# Patient Record
Sex: FEMALE | Race: WHITE | Employment: UNEMPLOYED | URBAN - METROPOLITAN AREA
[De-identification: names, ages, dates, MRNs, and addresses within clinical notes are randomized per-mention and may not be internally consistent; named-entity substitution may affect disease eponyms.]

---

## 2018-03-07 ENCOUNTER — HOSPITAL ENCOUNTER (OUTPATIENT)
Dept: MAMMOGRAPHY | Age: 53
Discharge: HOME | End: 2018-03-07
Attending: FAMILY MEDICINE
Payer: COMMERCIAL

## 2018-03-07 DIAGNOSIS — Z12.31 VISIT FOR SCREENING MAMMOGRAM: ICD-10-CM

## 2018-03-07 PROCEDURE — 77067 SCR MAMMO BI INCL CAD: CPT

## 2020-07-04 ENCOUNTER — HOSPITAL ENCOUNTER (INPATIENT)
Age: 55
LOS: 6 days | Discharge: HOME HEALTH | DRG: 137 | End: 2020-07-10
Attending: INTERNAL MEDICINE | Admitting: FAMILY MEDICINE
Payer: COMMERCIAL

## 2020-07-04 ENCOUNTER — APPOINTMENT (OUTPATIENT)
Dept: GENERAL RADIOLOGY | Age: 55
DRG: 137 | End: 2020-07-04
Attending: EMERGENCY MEDICINE
Payer: COMMERCIAL

## 2020-07-04 ENCOUNTER — HOSPITAL ENCOUNTER (EMERGENCY)
Age: 55
Discharge: OTHER INSTITUTION | DRG: 137 | End: 2020-07-04
Attending: EMERGENCY MEDICINE
Payer: COMMERCIAL

## 2020-07-04 VITALS
SYSTOLIC BLOOD PRESSURE: 122 MMHG | HEART RATE: 54 BPM | BODY MASS INDEX: 36.65 KG/M2 | DIASTOLIC BLOOD PRESSURE: 59 MMHG | OXYGEN SATURATION: 96 % | WEIGHT: 220 LBS | HEIGHT: 65 IN | TEMPERATURE: 98.4 F | RESPIRATION RATE: 16 BRPM

## 2020-07-04 DIAGNOSIS — Z20.822 SUSPECTED COVID-19 VIRUS INFECTION: Primary | ICD-10-CM

## 2020-07-04 PROBLEM — E11.9 DIABETES MELLITUS TYPE 2, CONTROLLED (HCC): Status: ACTIVE | Noted: 2020-07-04

## 2020-07-04 PROBLEM — F84.0 AUTISM DISORDER: Status: ACTIVE | Noted: 2020-07-04

## 2020-07-04 PROBLEM — J96.01 ACUTE RESPIRATORY FAILURE WITH HYPOXEMIA (HCC): Status: ACTIVE | Noted: 2020-07-04

## 2020-07-04 PROBLEM — U07.1 COVID-19: Status: ACTIVE | Noted: 2020-07-04

## 2020-07-04 LAB
ALBUMIN SERPL-MCNC: 3 G/DL (ref 3.5–5)
ALBUMIN/GLOB SERPL: 0.7 {RATIO} (ref 1.2–3.5)
ALP SERPL-CCNC: 51 U/L (ref 50–130)
ALT SERPL-CCNC: 38 U/L (ref 12–65)
ANION GAP SERPL CALC-SCNC: 7 MMOL/L (ref 7–16)
AST SERPL-CCNC: 51 U/L (ref 15–37)
BASOPHILS # BLD: 0 K/UL (ref 0–0.2)
BASOPHILS NFR BLD: 0 % (ref 0–2)
BILIRUB SERPL-MCNC: 0.5 MG/DL (ref 0.2–1.1)
BUN SERPL-MCNC: 14 MG/DL (ref 6–23)
CALCIUM SERPL-MCNC: 8.5 MG/DL (ref 8.3–10.4)
CHLORIDE SERPL-SCNC: 106 MMOL/L (ref 98–107)
CO2 SERPL-SCNC: 24 MMOL/L (ref 21–32)
COVID-19 RAPID TEST, COVR: DETECTED
CREAT SERPL-MCNC: 0.95 MG/DL (ref 0.6–1)
DIFFERENTIAL METHOD BLD: ABNORMAL
EOSINOPHIL # BLD: 0 K/UL (ref 0–0.8)
EOSINOPHIL NFR BLD: 0 % (ref 0.5–7.8)
ERYTHROCYTE [DISTWIDTH] IN BLOOD BY AUTOMATED COUNT: 13.3 % (ref 11.9–14.6)
GLOBULIN SER CALC-MCNC: 4.4 G/DL (ref 2.3–3.5)
GLUCOSE SERPL-MCNC: 123 MG/DL (ref 65–100)
HCT VFR BLD AUTO: 38.8 % (ref 35.8–46.3)
HGB BLD-MCNC: 13 G/DL (ref 11.7–15.4)
IMM GRANULOCYTES # BLD AUTO: 0 K/UL (ref 0–0.5)
IMM GRANULOCYTES NFR BLD AUTO: 1 % (ref 0–5)
LACTATE SERPL-SCNC: 1.5 MMOL/L (ref 0.4–2)
LYMPHOCYTES # BLD: 0.8 K/UL (ref 0.5–4.6)
LYMPHOCYTES NFR BLD: 12 % (ref 13–44)
MCH RBC QN AUTO: 28.4 PG (ref 26.1–32.9)
MCHC RBC AUTO-ENTMCNC: 33.5 G/DL (ref 31.4–35)
MCV RBC AUTO: 84.7 FL (ref 79.6–97.8)
MONOCYTES # BLD: 0.3 K/UL (ref 0.1–1.3)
MONOCYTES NFR BLD: 5 % (ref 4–12)
NEUTS SEG # BLD: 5.2 K/UL (ref 1.7–8.2)
NEUTS SEG NFR BLD: 82 % (ref 43–78)
NRBC # BLD: 0 K/UL (ref 0–0.2)
PLATELET # BLD AUTO: 210 K/UL (ref 150–450)
PMV BLD AUTO: 10 FL (ref 9.4–12.3)
POTASSIUM SERPL-SCNC: 4.5 MMOL/L (ref 3.5–5.1)
PROT SERPL-MCNC: 7.4 G/DL (ref 6.3–8.2)
RBC # BLD AUTO: 4.58 M/UL (ref 4.05–5.2)
SODIUM SERPL-SCNC: 137 MMOL/L (ref 136–145)
SOURCE, COVRS: ABNORMAL
WBC # BLD AUTO: 6.3 K/UL (ref 4.3–11.1)

## 2020-07-04 PROCEDURE — 85025 COMPLETE CBC W/AUTO DIFF WBC: CPT

## 2020-07-04 PROCEDURE — 99285 EMERGENCY DEPT VISIT HI MDM: CPT

## 2020-07-04 PROCEDURE — 74011250636 HC RX REV CODE- 250/636: Performed by: EMERGENCY MEDICINE

## 2020-07-04 PROCEDURE — 87635 SARS-COV-2 COVID-19 AMP PRB: CPT

## 2020-07-04 PROCEDURE — 80053 COMPREHEN METABOLIC PANEL: CPT

## 2020-07-04 PROCEDURE — 71045 X-RAY EXAM CHEST 1 VIEW: CPT

## 2020-07-04 PROCEDURE — 83605 ASSAY OF LACTIC ACID: CPT

## 2020-07-04 PROCEDURE — 65270000029 HC RM PRIVATE

## 2020-07-04 PROCEDURE — 74011250636 HC RX REV CODE- 250/636: Performed by: FAMILY MEDICINE

## 2020-07-04 PROCEDURE — 74011250637 HC RX REV CODE- 250/637: Performed by: EMERGENCY MEDICINE

## 2020-07-04 PROCEDURE — 74011000258 HC RX REV CODE- 258: Performed by: FAMILY MEDICINE

## 2020-07-04 RX ORDER — ACETAMINOPHEN 325 MG/1
650 TABLET ORAL
Status: DISCONTINUED | OUTPATIENT
Start: 2020-07-04 | End: 2020-07-10 | Stop reason: HOSPADM

## 2020-07-04 RX ORDER — ADHESIVE BANDAGE
30 BANDAGE TOPICAL DAILY PRN
Status: DISCONTINUED | OUTPATIENT
Start: 2020-07-04 | End: 2020-07-10 | Stop reason: HOSPADM

## 2020-07-04 RX ORDER — SODIUM CHLORIDE 0.9 % (FLUSH) 0.9 %
5-40 SYRINGE (ML) INJECTION AS NEEDED
Status: DISCONTINUED | OUTPATIENT
Start: 2020-07-04 | End: 2020-07-10 | Stop reason: HOSPADM

## 2020-07-04 RX ORDER — SODIUM CHLORIDE 9 MG/ML
125 INJECTION, SOLUTION INTRAVENOUS CONTINUOUS
Status: DISCONTINUED | OUTPATIENT
Start: 2020-07-04 | End: 2020-07-05

## 2020-07-04 RX ORDER — ONDANSETRON 2 MG/ML
4 INJECTION INTRAMUSCULAR; INTRAVENOUS
Status: DISCONTINUED | OUTPATIENT
Start: 2020-07-04 | End: 2020-07-10 | Stop reason: HOSPADM

## 2020-07-04 RX ORDER — ACETAMINOPHEN 500 MG
1000 TABLET ORAL
Status: COMPLETED | OUTPATIENT
Start: 2020-07-04 | End: 2020-07-04

## 2020-07-04 RX ORDER — SODIUM CHLORIDE 0.9 % (FLUSH) 0.9 %
5-40 SYRINGE (ML) INJECTION EVERY 8 HOURS
Status: DISCONTINUED | OUTPATIENT
Start: 2020-07-04 | End: 2020-07-10 | Stop reason: HOSPADM

## 2020-07-04 RX ORDER — ENOXAPARIN SODIUM 100 MG/ML
40 INJECTION SUBCUTANEOUS EVERY 24 HOURS
Status: DISCONTINUED | OUTPATIENT
Start: 2020-07-04 | End: 2020-07-10 | Stop reason: HOSPADM

## 2020-07-04 RX ADMIN — Medication 10 ML: at 21:32

## 2020-07-04 RX ADMIN — SODIUM CHLORIDE 125 ML/HR: 9 INJECTION, SOLUTION INTRAVENOUS at 20:40

## 2020-07-04 RX ADMIN — CEFTRIAXONE SODIUM 2 G: 2 INJECTION, POWDER, FOR SOLUTION INTRAMUSCULAR; INTRAVENOUS at 20:39

## 2020-07-04 RX ADMIN — ACETAMINOPHEN 1000 MG: 500 TABLET, FILM COATED ORAL at 12:41

## 2020-07-04 RX ADMIN — SODIUM CHLORIDE 1000 ML: 900 INJECTION, SOLUTION INTRAVENOUS at 14:52

## 2020-07-04 RX ADMIN — AZITHROMYCIN DIHYDRATE 500 MG: 500 INJECTION, POWDER, LYOPHILIZED, FOR SOLUTION INTRAVENOUS at 20:39

## 2020-07-04 RX ADMIN — ENOXAPARIN SODIUM 40 MG: 40 INJECTION SUBCUTANEOUS at 20:40

## 2020-07-04 NOTE — ED PROVIDER NOTES
49-year-old lady presents with concerns about fevers, coughing, and not feeling well. She is autistic and she lives with her parents. Both of whom have tested positive for COVID-19 as has her brother. They all got together with him on Father's Day and later that night he tested positive for COVID-19. She is gotten worse and has become more short of breath over the last few days. She does have a history of diabetes. Due to her developmental delays she does not communicate well and most of the history is obtained from the sister. Elements of this note were created using speech recognition software. As such, errors of speech recognition may be present.            Past Medical History:   Diagnosis Date    Diabetes ([facility] [address]     Ill-defined condition        Past Surgical History:   Procedure Laterality Date    HX ORTHOPAEDIC           Family History:   Problem Relation Age of Onset    Breast Cancer Neg Hx        Social History     Socioeconomic History    Marital status: SINGLE     Spouse name: Not on file    Number of children: Not on file    Years of education: Not on file    Highest education level: Not on file   Occupational History    Not on file   Social Needs    Financial resource strain: Not on file    Food insecurity     Worry: Not on file     Inability: Not on file    Transportation needs     Medical: Not on file     Non-medical: Not on file   Tobacco Use    Smoking status: Not on file   Substance and Sexual Activity    Alcohol use: Not on file    Drug use: Not on file    Sexual activity: Not on file   Lifestyle    Physical activity     Days per week: Not on file     Minutes per session: Not on file    Stress: Not on file   Relationships    Social connections     Talks on phone: Not on file     Gets together: Not on file     Attends [employer] service: Not on file     Active member of club or organization: Not on file     Attends meetings of clubs or organizations: Not on file Relationship status: Not on file    Intimate partner violence     Fear of current or ex partner: Not on file     Emotionally abused: Not on file     Physically abused: Not on file     Forced sexual activity: Not on file   Other Topics Concern    Not on file   Social History Narrative    Not on file         ALLERGIES: Patient has no known allergies. Review of Systems   Reason unable to perform ROS: Review of systems obtained from the sister. Constitutional: Positive for appetite change, chills, fatigue and fever. Negative for diaphoresis. HENT: Negative for congestion, rhinorrhea and sore throat. Eyes: Negative for redness and visual disturbance. Respiratory: Positive for cough and shortness of breath. Negative for chest tightness and wheezing. Cardiovascular: Negative for chest pain and palpitations. Gastrointestinal: Negative for abdominal pain, blood in stool, diarrhea, nausea and vomiting. Endocrine: Negative for polydipsia and polyuria. Genitourinary: Negative for dysuria and hematuria. Musculoskeletal: Negative for arthralgias, myalgias and neck stiffness. Skin: Negative for rash. Allergic/Immunologic: Negative for environmental allergies and food allergies. Neurological: Negative for dizziness, weakness and headaches. Hematological: Negative for adenopathy. Does not bruise/bleed easily. Psychiatric/Behavioral: Negative for confusion and sleep disturbance. The patient is not nervous/anxious. Vitals:    07/04/20 1235 07/04/20 1312   BP: 97/65    Pulse: 87    Resp: 16    Temp: (!) 102.4 °F (39.1 °C)    SpO2: 91% 92%   Weight: 99.8 kg (220 lb)    Height: 5' 5\" (1.651 m)             Physical Exam  Vitals signs and nursing note reviewed. Constitutional:       General: She is not in acute distress. Appearance: She is well-developed. She is not toxic-appearing. HENT:      Head: Normocephalic and atraumatic. Eyes:      General: No scleral icterus.         Right eye: No discharge. Left eye: No discharge. Conjunctiva/sclera: Conjunctivae normal.      Pupils: Pupils are equal, round, and reactive to light. Neck:      Musculoskeletal: Normal range of motion. No neck rigidity. Cardiovascular:      Rate and Rhythm: Normal rate and regular rhythm. Heart sounds: Normal heart sounds. Pulmonary:      Effort: Pulmonary effort is normal. No respiratory distress. Breath sounds: Normal breath sounds. No wheezing or rales. Chest:      Chest wall: No tenderness. Abdominal:      General: Bowel sounds are normal. There is no distension. Palpations: Abdomen is soft. Tenderness: There is no guarding or rebound. Musculoskeletal: Normal range of motion. General: No tenderness. Lymphadenopathy:      Cervical: No cervical adenopathy. Skin:     General: Skin is warm and dry. Neurological:      General: No focal deficit present. Mental Status: She is alert and oriented to person, place, and time. Psychiatric:         Mood and Affect: Mood normal.         Behavior: Behavior normal.          MDM  Number of Diagnoses or Management Options  Diagnosis management comments: Patient's exam and history are consistent with COVID-19. She is hypoxic on room air. I will plan to admit for oxygen and supportive care. ED Course as of Jul 04 1528   Sat Jul 04, 2020   1356 Very high suspicion for COVID-19. She has been hypoxic on room air and her chest x-ray is consistent with it.   I spoke with the [facility] hospitalist who kindly accepted the patient in transfer.    Candie Simmons      ED Course User Index  [AC] Cristiano Mckoy MD       Procedures

## 2020-07-04 NOTE — PROGRESS NOTES
Patient arrived to room 825 via EMS transport. Patient is alert and orientated with no complaints at this time. Patient is autistic. Patient is calm and cooperative at this time. Patient IV in L AC is c/d/i. Duel skin assessment completed with Radu Hong RN. Patient is flush in the face but no skin issues at this time. Patient is orientated to room and surroundings. Patient able to ambulate independently with no assistance. Bed in low locked position with call light within reach. Patient instructed to call if assistance is needed. TV on for patient's enjoyment.   Report to be given to oncoming RN 7p-7a

## 2020-07-04 NOTE — ED TRIAGE NOTES
Patient with history of autism and been exposed to COVID 19 complaining of weakness, diarrhea and decreased oxygen levels at home. Patient with fever in triage. Patient with mild cough per sister as well. Patient with mask on at this time.

## 2020-07-04 NOTE — PROGRESS NOTES
TRANSFER - IN REPORT:    Verbal report received from Simeon Sharp RN on LeukoDx  being received from [place] ED for routine progression of care      Report consisted of patients Situation, Background, Assessment and   Recommendations(SBAR). Information from the following report(s) SBAR was reviewed with the receiving nurse. Opportunity for questions and clarification was provided. Assessment completed upon patients arrival to unit and care assumed.

## 2020-07-04 NOTE — ED NOTES
TRANSFER - OUT REPORT:    Verbal report given to PATRICK Cordero on [employer]  being transferred to [address] [place] [address] for routine progression of care       Report consisted of patients Situation, Background, Assessment and   Recommendations(SBAR). Information from the following report(s) ED Summary, MAR and Recent Results was reviewed with the receiving nurse. Lines:   Peripheral IV 07/04/20 Right Antecubital (Active)        Opportunity for questions and clarification was provided.       Patient transported with:   [employer]
CDU NOTE WILDA ROB: Pt resting comfortably. Vomiting resolved, tolerated PO intake, mild nausea still, doesn't want anything for the nausea, still with watery NB diarrhea 2 x today. NAD, VSS. Abd: flat/ND/soft, mild ttp to epigastric area. pt states he thinks that is from his hiatal hernia, no abd pain without palpation.  will continue monitoring.

## 2020-07-04 NOTE — H&P
HOSPITALIST INITIAL HISTORY AND PHYSICAL    NAME:  Logan Payne   Age:  54 y.o.  :   1965   MRN:   [id number]  PCP: Pola Guzman MD  Consulting MD:  Treatment Team: Attending Provider: Tanja Mcconnell MD    CHIEF COMPLAINT: fever, cough    HISTORY OF PRESENT ILLNESS:   Logan Payne is a 54 y.o. female with a past medical history of autism and diabetes who presents to the ER with copmlaint of fevers and cough for the past few days. Both of her parents and brother have tested positive for COVID-19. She has no complaints currently and denies any pain or discomfort. Reports that her breathing is better. REVIEW OF SYSTEMS: Comprehensive ROS performed. Admits to fevers, chills, denies nausea, vomiting, otherwise negative. Past Medical History:   Diagnosis Date    Diabetes ([address]     Ill-defined condition         Past Surgical History:   Procedure Laterality Date    HX ORTHOPAEDIC         None       No Known Allergies    FAMILY HISTORY: Reviewed. Negative except   Family History   Problem Relation Age of Onset    Breast Cancer Neg Hx        Social History     Tobacco Use    Smoking status: Not on file   Substance Use Topics    Alcohol use: Not on file         Objective:     Visit Vitals  /58 (BP 1 Location: Right arm, BP Patient Position: At rest)   Pulse 62   Temp 98.3 °F (36.8 °C)   Resp 16   SpO2 95%      Temp (24hrs), Av.3 °F (37.4 °C), Min:98.1 °F (36.7 °C), Max:102.4 °F (39.1 °C)    Oxygen Therapy  O2 Sat (%): 95 % (20 1831)  Physical Exam:  General:    The patient is a pleasant middle aged female in no acute distress. Head:   Normocephalic/atraumatic. Eyes:  No palpebral pallor or scleral icterus. ENT:  External auricular and nasal exam within normal limits. Mucous membranes are moist.  Lungs:   No respiratory distress or accessory muscle use. Abdomen:    non-distended  Skin:     Normal color, texture, and turgor.  No rashes, lesions, or jaundice. Neurologic: CN II-XII grossly intact and symmetrical.     Moving all four extremities well with no focal deficits. Psychiatric: Pleasant demeanor, appropriate affect. Alert     Data Review:   Recent Results (from the past 24 hour(s))   CBC WITH AUTOMATED DIFF    Collection Time: 07/04/20  1:32 PM   Result Value Ref Range    WBC 6.3 4.3 - 11.1 K/uL    RBC 4.58 4.05 - 5.2 M/uL    HGB 13.0 11.7 - 15.4 g/dL    HCT 38.8 35.8 - 46.3 %    MCV 84.7 79.6 - 97.8 FL    MCH 28.4 26.1 - 32.9 PG    MCHC 33.5 31.4 - 35.0 g/dL    RDW 13.3 11.9 - 14.6 %    PLATELET 921 254 - 962 K/uL    MPV 10.0 9.4 - 12.3 FL    ABSOLUTE NRBC 0.00 0.0 - 0.2 K/uL    DF AUTOMATED      NEUTROPHILS 82 (H) 43 - 78 %    LYMPHOCYTES 12 (L) 13 - 44 %    MONOCYTES 5 4.0 - 12.0 %    EOSINOPHILS 0 (L) 0.5 - 7.8 %    BASOPHILS 0 0.0 - 2.0 %    IMMATURE GRANULOCYTES 1 0.0 - 5.0 %    ABS. NEUTROPHILS 5.2 1.7 - 8.2 K/UL    ABS. LYMPHOCYTES 0.8 0.5 - 4.6 K/UL    ABS. MONOCYTES 0.3 0.1 - 1.3 K/UL    ABS. EOSINOPHILS 0.0 0.0 - 0.8 K/UL    ABS. BASOPHILS 0.0 0.0 - 0.2 K/UL    ABS. IMM. GRANS. 0.0 0.0 - 0.5 K/UL   METABOLIC PANEL, COMPREHENSIVE    Collection Time: 07/04/20  1:32 PM   Result Value Ref Range    Sodium 137 136 - 145 mmol/L    Potassium 4.5 3.5 - 5.1 mmol/L    Chloride 106 98 - 107 mmol/L    CO2 24 21 - 32 mmol/L    Anion gap 7 7 - 16 mmol/L    Glucose 123 (H) 65 - 100 mg/dL    BUN 14 6 - 23 MG/DL    Creatinine 0.95 0.6 - 1.0 MG/DL    GFR est AA >60 >60 ml/min/1.73m2    GFR est non-AA >60 >60 ml/min/1.73m2    Calcium 8.5 8.3 - 10.4 MG/DL    Bilirubin, total 0.5 0.2 - 1.1 MG/DL    ALT (SGPT) 38 12 - 65 U/L    AST (SGOT) 51 (H) 15 - 37 U/L    Alk.  phosphatase 51 50 - 130 U/L    Protein, total 7.4 6.3 - 8.2 g/dL    Albumin 3.0 (L) 3.5 - 5.0 g/dL    Globulin 4.4 (H) 2.3 - 3.5 g/dL    A-G Ratio 0.7 (L) 1.2 - 3.5     LACTIC ACID    Collection Time: 07/04/20  1:32 PM   Result Value Ref Range    Lactic acid 1.5 0.4 - 2.0 MMOL/L   COVID-19 RAPID TEST Collection Time: 07/04/20  2:22 PM   Result Value Ref Range    Specimen source Nasopharyngeal      COVID-19 rapid test Detected (A) NOTD         Imaging Carl Quigley:  Xr Chest Port    Result Date: 7/4/2020  Impression:  Findings consistent with patient's suspected acute atypical pneumonia. Assessment and Plan:     Principal Problem:    Acute respiratory failure with hypoxemia ([address] (7/4/2020)    Wean O2 as tolerated. Active Problems:    COVID-19 (7/4/2020)    Positive on rapid test. Also evidence of pneumonia on CXR. HCQ, azithromycin, Rocephin, zinc, vitamin C. Follow EKG, mag phos. Check D dimer, CRP, ferritin, HIV, LDH      Diabetes mellitus type 2, controlled ([address] (7/4/2020)    Stable. Continue home meds. SSI      Autism disorder (7/4/2020)    Stable. DVT Prophylaxis: Lovenox      Code Status: FULL CODE      Disposition: Admit to med/surg for evaluation and treatment as per above.       Anticipated discharge: 2-3 days     Signed By: Nick Muir MD     July 4, 2020

## 2020-07-04 NOTE — ACP (ADVANCE CARE PLANNING)
MARV spoke with the patient's mother Storm Marshall who states that the patient is autistic and she and her  have POA for the patient. Nikolas Tiwari states that the patient does not have an ACP, but she and her  make medical decisions for the patient.

## 2020-07-05 LAB
ABO + RH BLD: NORMAL
ALBUMIN SERPL-MCNC: 2.7 G/DL (ref 3.5–5)
ALBUMIN/GLOB SERPL: 0.8 {RATIO} (ref 1.2–3.5)
ALP SERPL-CCNC: 52 U/L (ref 50–136)
ALT SERPL-CCNC: 31 U/L (ref 12–65)
ANION GAP SERPL CALC-SCNC: 7 MMOL/L (ref 7–16)
ANION GAP SERPL CALC-SCNC: 8 MMOL/L (ref 7–16)
AST SERPL-CCNC: 41 U/L (ref 15–37)
BASOPHILS # BLD: 0 K/UL (ref 0–0.2)
BASOPHILS NFR BLD: 0 % (ref 0–2)
BILIRUB SERPL-MCNC: 0.3 MG/DL (ref 0.2–1.1)
BUN SERPL-MCNC: 11 MG/DL (ref 6–23)
BUN SERPL-MCNC: 7 MG/DL (ref 6–23)
CALCIUM SERPL-MCNC: 7.5 MG/DL (ref 8.3–10.4)
CALCIUM SERPL-MCNC: 8 MG/DL (ref 8.3–10.4)
CHLORIDE SERPL-SCNC: 112 MMOL/L (ref 98–107)
CHLORIDE SERPL-SCNC: 113 MMOL/L (ref 98–107)
CO2 SERPL-SCNC: 24 MMOL/L (ref 21–32)
CO2 SERPL-SCNC: 26 MMOL/L (ref 21–32)
CREAT SERPL-MCNC: 0.75 MG/DL (ref 0.6–1)
CREAT SERPL-MCNC: 0.78 MG/DL (ref 0.6–1)
CRP SERPL HS-MCNC: 119 MG/L
D DIMER PPP FEU-MCNC: 1.1 UG/ML(FEU)
DIFFERENTIAL METHOD BLD: ABNORMAL
EOSINOPHIL # BLD: 0 K/UL (ref 0–0.8)
EOSINOPHIL NFR BLD: 1 % (ref 0.5–7.8)
ERYTHROCYTE [DISTWIDTH] IN BLOOD BY AUTOMATED COUNT: 13.6 % (ref 11.9–14.6)
FERRITIN SERPL-MCNC: 688 NG/ML (ref 8–388)
GLOBULIN SER CALC-MCNC: 3.2 G/DL (ref 2.3–3.5)
GLUCOSE BLD STRIP.AUTO-MCNC: 182 MG/DL (ref 65–100)
GLUCOSE SERPL-MCNC: 84 MG/DL (ref 65–100)
GLUCOSE SERPL-MCNC: 88 MG/DL (ref 65–100)
HCT VFR BLD AUTO: 39.7 % (ref 35.8–46.3)
HGB BLD-MCNC: 12.4 G/DL (ref 11.7–15.4)
HIV 1+2 AB+HIV1 P24 AG SERPL QL IA: NONREACTIVE
HIV12 RESULT COMMENT, HHIVC: ABNORMAL
IMM GRANULOCYTES # BLD AUTO: 0 K/UL (ref 0–0.5)
IMM GRANULOCYTES NFR BLD AUTO: 1 % (ref 0–5)
LDH SERPL L TO P-CCNC: 296 U/L (ref 100–190)
LYMPHOCYTES # BLD: 1.3 K/UL (ref 0.5–4.6)
LYMPHOCYTES NFR BLD: 21 % (ref 13–44)
MAGNESIUM SERPL-MCNC: 2.1 MG/DL (ref 1.8–2.4)
MCH RBC QN AUTO: 27.7 PG (ref 26.1–32.9)
MCHC RBC AUTO-ENTMCNC: 31.2 G/DL (ref 31.4–35)
MCV RBC AUTO: 88.6 FL (ref 79.6–97.8)
MONOCYTES # BLD: 0.3 K/UL (ref 0.1–1.3)
MONOCYTES NFR BLD: 4 % (ref 4–12)
NEUTS SEG # BLD: 4.6 K/UL (ref 1.7–8.2)
NEUTS SEG NFR BLD: 74 % (ref 43–78)
NRBC # BLD: 0 K/UL (ref 0–0.2)
PHOSPHATE SERPL-MCNC: 3 MG/DL (ref 2.5–4.5)
PLATELET # BLD AUTO: 226 K/UL (ref 150–450)
PMV BLD AUTO: 10.8 FL (ref 9.4–12.3)
POTASSIUM SERPL-SCNC: 3.7 MMOL/L (ref 3.5–5.1)
POTASSIUM SERPL-SCNC: 4 MMOL/L (ref 3.5–5.1)
PROT SERPL-MCNC: 5.9 G/DL (ref 6.3–8.2)
RBC # BLD AUTO: 4.48 M/UL (ref 4.05–5.2)
SODIUM SERPL-SCNC: 144 MMOL/L (ref 136–145)
SODIUM SERPL-SCNC: 146 MMOL/L (ref 136–145)
WBC # BLD AUTO: 6.3 K/UL (ref 4.3–11.1)

## 2020-07-05 PROCEDURE — 94760 N-INVAS EAR/PLS OXIMETRY 1: CPT

## 2020-07-05 PROCEDURE — 74011250636 HC RX REV CODE- 250/636: Performed by: FAMILY MEDICINE

## 2020-07-05 PROCEDURE — 65270000029 HC RM PRIVATE

## 2020-07-05 PROCEDURE — 82728 ASSAY OF FERRITIN: CPT

## 2020-07-05 PROCEDURE — 74011636637 HC RX REV CODE- 636/637: Performed by: FAMILY MEDICINE

## 2020-07-05 PROCEDURE — 85379 FIBRIN DEGRADATION QUANT: CPT

## 2020-07-05 PROCEDURE — 74011000258 HC RX REV CODE- 258: Performed by: FAMILY MEDICINE

## 2020-07-05 PROCEDURE — 74011250637 HC RX REV CODE- 250/637: Performed by: FAMILY MEDICINE

## 2020-07-05 PROCEDURE — 87389 HIV-1 AG W/HIV-1&-2 AB AG IA: CPT

## 2020-07-05 PROCEDURE — 36415 COLL VENOUS BLD VENIPUNCTURE: CPT

## 2020-07-05 PROCEDURE — 82962 GLUCOSE BLOOD TEST: CPT

## 2020-07-05 PROCEDURE — 83615 LACTATE (LD) (LDH) ENZYME: CPT

## 2020-07-05 PROCEDURE — 84100 ASSAY OF PHOSPHORUS: CPT

## 2020-07-05 PROCEDURE — 74011000250 HC RX REV CODE- 250: Performed by: FAMILY MEDICINE

## 2020-07-05 PROCEDURE — 85025 COMPLETE CBC W/AUTO DIFF WBC: CPT

## 2020-07-05 PROCEDURE — C9113 INJ PANTOPRAZOLE SODIUM, VIA: HCPCS | Performed by: FAMILY MEDICINE

## 2020-07-05 PROCEDURE — 86141 C-REACTIVE PROTEIN HS: CPT

## 2020-07-05 PROCEDURE — 77010033678 HC OXYGEN DAILY

## 2020-07-05 PROCEDURE — 86900 BLOOD TYPING SEROLOGIC ABO: CPT

## 2020-07-05 PROCEDURE — 83735 ASSAY OF MAGNESIUM: CPT

## 2020-07-05 PROCEDURE — 80053 COMPREHEN METABOLIC PANEL: CPT

## 2020-07-05 RX ORDER — HYDROXYCHLOROQUINE SULFATE 200 MG/1
400 TABLET, FILM COATED ORAL
Status: DISCONTINUED | OUTPATIENT
Start: 2020-07-05 | End: 2020-07-05

## 2020-07-05 RX ORDER — DEXAMETHASONE 4 MG/1
6 TABLET ORAL DAILY
Status: DISCONTINUED | OUTPATIENT
Start: 2020-07-05 | End: 2020-07-10 | Stop reason: HOSPADM

## 2020-07-05 RX ORDER — HYDROXYCHLOROQUINE SULFATE 200 MG/1
400 TABLET, FILM COATED ORAL 2 TIMES DAILY WITH MEALS
Status: DISCONTINUED | OUTPATIENT
Start: 2020-07-05 | End: 2020-07-05

## 2020-07-05 RX ORDER — UREA 10 %
220 LOTION (ML) TOPICAL DAILY
Status: DISCONTINUED | OUTPATIENT
Start: 2020-07-05 | End: 2020-07-10 | Stop reason: HOSPADM

## 2020-07-05 RX ORDER — SODIUM CHLORIDE 9 MG/ML
250 INJECTION, SOLUTION INTRAVENOUS AS NEEDED
Status: DISCONTINUED | OUTPATIENT
Start: 2020-07-05 | End: 2020-07-10 | Stop reason: HOSPADM

## 2020-07-05 RX ADMIN — ENOXAPARIN SODIUM 40 MG: 40 INJECTION SUBCUTANEOUS at 21:31

## 2020-07-05 RX ADMIN — ASCORBIC ACID 1.5 G: 500 INJECTION, SOLUTION INTRAMUSCULAR; INTRAVENOUS; SUBCUTANEOUS at 17:29

## 2020-07-05 RX ADMIN — Medication 10 ML: at 06:48

## 2020-07-05 RX ADMIN — ASCORBIC ACID 1.5 G: 500 INJECTION, SOLUTION INTRAMUSCULAR; INTRAVENOUS; SUBCUTANEOUS at 10:32

## 2020-07-05 RX ADMIN — HUMAN INSULIN 3 UNITS: 100 INJECTION, SOLUTION SUBCUTANEOUS at 22:00

## 2020-07-05 RX ADMIN — ASCORBIC ACID 1.5 G: 500 INJECTION, SOLUTION INTRAMUSCULAR; INTRAVENOUS; SUBCUTANEOUS at 23:39

## 2020-07-05 RX ADMIN — Medication 220 MG: at 09:57

## 2020-07-05 RX ADMIN — DEXAMETHASONE 6 MG: 4 TABLET ORAL at 12:23

## 2020-07-05 RX ADMIN — Medication 10 ML: at 21:32

## 2020-07-05 RX ADMIN — SODIUM CHLORIDE 40 MG: 9 INJECTION, SOLUTION INTRAMUSCULAR; INTRAVENOUS; SUBCUTANEOUS at 09:56

## 2020-07-05 NOTE — PROGRESS NOTES
[address] is unable to visit patient presently due to covid restrictions    Reviewed chart for spiritual concerns    No needs noted currently      Dedrick John, staff

## 2020-07-05 NOTE — PROGRESS NOTES
History of Present Illness/Hospital Course:  59-year-old female presenting to [address] [place] [address] emergency department with a complaint of fevers and cough over the last several days. Patient has had multiple family contacts tested positive for COVID-19. Patient found to be hypoxic in the emergency department and admitted for treatment of suspected novel coronavirus infected pneumonia. Today: Patient with persistent hypoxic respiratory failure. Rapid COVID test positive. Start dexamethasone, consent for convalescent plasma, consult infectious disease for remdesivir. Comprehensive review of systems negative unless stated above. I have personally reviewed all current data for this patient.     Physical Exam:  General: Middle-aged white female, lying in bed, no acute distress  HEENT: NCAT, moist mucous membranes  Skin: No rash noted  Cardio: RRR, normal S1/S2, no rubs, no gallops, no murmurs  Pulm: Non labored respirations on 2 LPM via NC, LCAB, no wheezing, no rales, no rhonchi  GI: Soft, Nt, Nd, Nml bowel sounds, no masses noted  Extremity: Atraumatic, no deformities, no edema  Neuro: Alert, oriented, moving all extremities, no focal deficits noted  Psych: Pleasant, cooperative, normal range of affect    Assessment and Plan:    #Novel coronavirus infected pneumonia:  -Continue inpatient  -Dexamethasone oral  -Consent for convalescent plasma  -Consult infectious disease for remdesivir  -Zinc sulfate  -Ascorbic acid  -Discontinue IVF  -Daily labs    Inpatient for above    Full code    Enoxaparin for VTE prophylaxis

## 2020-07-05 NOTE — CONSULTS
Infectious Disease Non-face to Face Encounter    Today's date: 7/5/2020  Admit date: 7/4/2020    Impression:     1. COVID-19 infection (7/4/20)    Recommendations:      Check baseline ABO compatibility   o If convalescent serum is appropriate, please consult heme/onc   Pt does not have an O2 requirement so would not initiate remdesivir at this time. Please consider ID re-consult if O2 requirement worsens significantly.  Agree with dexamethasone for now. Check inflammatory labs and continue for 5-10 days if elevated.  Agree with DVT prophylaxis   Recommend screening for HIV and viral hepatitis   Monitoring:  o At least q 48 hour CBC with diff, CMP  o Aggressive pulse Ox monitoring  o Conservative fluid strategy - would not give empiric fluid resuscitation unless signs of volume depletion (hypotension, NATA, etc) are present  o Consider self proning patient    Fact Sheet for Patients And Parent/Caregivers   Fact Sheets for Patients and Parent/Caregivers - [place]    Anti-Infectives:      S/p vanc/zosyn    Clinical Synopsis:     ID consult for COVID      Allergies:  No Known Allergies    Anticoagulants:  Key Anti-Platelet Anticoagulant Meds     The patient is on no antiplatelet meds or anticoagulants.            Objective:     Physical Exam:    Vitals:   Current   Temperature: 99 °F (37.2 °C)   Heart Rate: 61   Resp Rate: 14   Blood Pressure: (!) 89/50(RN notified)   Oxygen Sats: 95 %         Patient Vitals for the past 12 hrs:   Temp Pulse Resp BP SpO2   07/05/20 0820 99 °F (37.2 °C) 61 14 (!) 89/50 95 %   07/05/20 0340 99 °F (37.2 °C) 71 16 93/50 93 %   07/04/20 2352 99.3 °F (37.4 °C) 70 17 93/50 96 %       No components found for: QTC      Labs:     COVID 19 related labs:  No results found for: CRP, LDH    CBC:      CBC WITH AUTOMATED DIFF    Collection Time: 07/05/20  5:13 AM   Result Value Ref Range    WBC 6.3 4.3 - 11.1 K/uL    RBC 4.48 4.05 - 5.2 M/uL    HGB 12.4 11.7 - 15.4 g/dL    HCT 39.7 35.8 - 46.3 %    MCV 88.6 79.6 - 97.8 FL    MCH 27.7 26.1 - 32.9 PG    MCHC 31.2 (L) 31.4 - 35.0 g/dL    RDW 13.6 11.9 - 14.6 %    PLATELET 368 661 - 650 K/uL    MPV 10.8 9.4 - 12.3 FL    ABSOLUTE NRBC 0.00 0.0 - 0.2 K/uL    DF AUTOMATED      NEUTROPHILS 74 43 - 78 %    LYMPHOCYTES 21 13 - 44 %    MONOCYTES 4 4.0 - 12.0 %    EOSINOPHILS 1 0.5 - 7.8 %    BASOPHILS 0 0.0 - 2.0 %    IMMATURE GRANULOCYTES 1 0.0 - 5.0 %    ABS. NEUTROPHILS 4.6 1.7 - 8.2 K/UL    ABS. LYMPHOCYTES 1.3 0.5 - 4.6 K/UL    ABS. MONOCYTES 0.3 0.1 - 1.3 K/UL    ABS. EOSINOPHILS 0.0 0.0 - 0.8 K/UL    ABS. BASOPHILS 0.0 0.0 - 0.2 K/UL    ABS. IMM. GRANS. 0.0 0.0 - 0.5 K/UL       CMP:  No results found for this visit on 07/04/20 (from the past 12 hour(s)). Microbiology:     All Micro Results     None          Recent Imaging:     reviewed    Signed By: [unfilled]     July 5, 2020      Please note, requesting provider was notified via [email or web address] of the above documentation for his or her review. The patient's status was discussed with the patient's primary provider as well as the patient's primary nurse. ID did not physically enter the patient's room, nor did ID use a remote telehealth monitor, due to facility restrictions on the use of personal protective equipment and the need to preserve personal protective equipment at this time.     Time spent on the above: 10 minutes

## 2020-07-05 NOTE — PROGRESS NOTES
Request by treating physician/team  for COVID-19 convalescent plasma based on patient meeting one of the following criteria, which I verified by record review:   1. Age at least 25 years   2. Laboratory confirmed diagnosis of infection with SARS-CoV-2   3. Admitted to an acute care facility for the treatment of ANYQA-17 complications   4. Severe or life threatening COVID-19, or judged by the treating provider to be at high risk of progression to severe or life-threatening disease   5. Informed consent provided by the patient or healthcare proxy   Severe COVID-19 is defined by one or more of the following:  dyspnea  respiratory frequency ? 30/min  blood oxygen saturation ? 93%  partial pressure of arterial oxygen to fraction of inspired oxygen ratio < 300  lung infiltrates > 50% within 24 to 48 hours Life-threatening COVID-19 is defined as one or more of the following:    respiratory failure  septic shock  multiple organ dysfunction or failure     Consent was obtained by treating physician/team and patient was registered and assigned patient code. Communicated with blood bank and 1 unit of blood type specific COVID convalescent plasma was ordered for the patient. As per study protocol, adverse events will be monitored and reported and all regulatory procedures will be followed.     Swathi Marques MD  Director, Adolescent Young Adult [address] [place] [address] and Blood Disorders Program  [address] [place] [address] Phone       Patient Code: [id number]    Patient's Last Name: white    Patient's First Name: Tejal Rainey    Patient MRN: [id number]    Patient's YOB: 1962    Date of First Plasma Transfusion: _____

## 2020-07-05 NOTE — PROGRESS NOTES
Pt in bed resting, assessment complete. Pt alert and oriented. RR even, unlabored, no noted distress. Pt denies needs. Bed L/L, call bell is within reach and side rails are up x 2.

## 2020-07-06 ENCOUNTER — PATIENT OUTREACH (OUTPATIENT)
Dept: CASE MANAGEMENT | Age: 55
End: 2020-07-06

## 2020-07-06 LAB
ALBUMIN SERPL-MCNC: 2.4 G/DL (ref 3.5–5)
ALBUMIN/GLOB SERPL: 0.5 {RATIO} (ref 1.2–3.5)
ALP SERPL-CCNC: 49 U/L (ref 50–136)
ALT SERPL-CCNC: 31 U/L (ref 12–65)
ANION GAP SERPL CALC-SCNC: 6 MMOL/L (ref 7–16)
AST SERPL-CCNC: 32 U/L (ref 15–37)
BASOPHILS # BLD: 0 K/UL (ref 0–0.2)
BASOPHILS NFR BLD: 0 % (ref 0–2)
BILIRUB SERPL-MCNC: 0.3 MG/DL (ref 0.2–1.1)
BUN SERPL-MCNC: 9 MG/DL (ref 6–23)
CALCIUM SERPL-MCNC: 8.6 MG/DL (ref 8.3–10.4)
CHLORIDE SERPL-SCNC: 113 MMOL/L (ref 98–107)
CO2 SERPL-SCNC: 28 MMOL/L (ref 21–32)
CREAT SERPL-MCNC: 0.71 MG/DL (ref 0.6–1)
CRP SERPL-MCNC: 9.2 MG/DL (ref 0–0.9)
DIFFERENTIAL METHOD BLD: ABNORMAL
EOSINOPHIL # BLD: 0 K/UL (ref 0–0.8)
EOSINOPHIL NFR BLD: 0 % (ref 0.5–7.8)
ERYTHROCYTE [DISTWIDTH] IN BLOOD BY AUTOMATED COUNT: 13.2 % (ref 11.9–14.6)
FERRITIN SERPL-MCNC: 722 NG/ML (ref 8–388)
GLOBULIN SER CALC-MCNC: 4.6 G/DL (ref 2.3–3.5)
GLUCOSE BLD STRIP.AUTO-MCNC: 101 MG/DL (ref 65–100)
GLUCOSE BLD STRIP.AUTO-MCNC: 136 MG/DL (ref 65–100)
GLUCOSE BLD STRIP.AUTO-MCNC: 140 MG/DL (ref 65–100)
GLUCOSE BLD STRIP.AUTO-MCNC: 184 MG/DL (ref 65–100)
GLUCOSE BLD STRIP.AUTO-MCNC: 93 MG/DL (ref 65–100)
GLUCOSE SERPL-MCNC: 141 MG/DL (ref 65–100)
HAV IGM SER QL: NONREACTIVE
HBV CORE IGM SER QL: NONREACTIVE
HBV SURFACE AG SER QL: NONREACTIVE
HCT VFR BLD AUTO: 41.5 % (ref 35.8–46.3)
HCV AB SER QL: NONREACTIVE
HGB BLD-MCNC: 12.8 G/DL (ref 11.7–15.4)
IMM GRANULOCYTES # BLD AUTO: 0 K/UL (ref 0–0.5)
IMM GRANULOCYTES NFR BLD AUTO: 1 % (ref 0–5)
LDH SERPL L TO P-CCNC: 305 U/L (ref 100–190)
LYMPHOCYTES # BLD: 1 K/UL (ref 0.5–4.6)
LYMPHOCYTES NFR BLD: 17 % (ref 13–44)
MAGNESIUM SERPL-MCNC: 2.8 MG/DL (ref 1.8–2.4)
MCH RBC QN AUTO: 27.2 PG (ref 26.1–32.9)
MCHC RBC AUTO-ENTMCNC: 30.8 G/DL (ref 31.4–35)
MCV RBC AUTO: 88.1 FL (ref 79.6–97.8)
MONOCYTES # BLD: 0.3 K/UL (ref 0.1–1.3)
MONOCYTES NFR BLD: 5 % (ref 4–12)
NEUTS SEG # BLD: 4.6 K/UL (ref 1.7–8.2)
NEUTS SEG NFR BLD: 77 % (ref 43–78)
NRBC # BLD: 0 K/UL (ref 0–0.2)
PHOSPHATE SERPL-MCNC: 3.2 MG/DL (ref 2.5–4.5)
PLATELET # BLD AUTO: 254 K/UL (ref 150–450)
PMV BLD AUTO: 10.2 FL (ref 9.4–12.3)
POTASSIUM SERPL-SCNC: 3.9 MMOL/L (ref 3.5–5.1)
PROT SERPL-MCNC: 7 G/DL (ref 6.3–8.2)
RBC # BLD AUTO: 4.71 M/UL (ref 4.05–5.2)
SODIUM SERPL-SCNC: 147 MMOL/L (ref 136–145)
WBC # BLD AUTO: 5.9 K/UL (ref 4.3–11.1)

## 2020-07-06 PROCEDURE — 83615 LACTATE (LD) (LDH) ENZYME: CPT

## 2020-07-06 PROCEDURE — 74011250636 HC RX REV CODE- 250/636: Performed by: FAMILY MEDICINE

## 2020-07-06 PROCEDURE — 30233L1 TRANSFUSION OF NONAUTOLOGOUS FRESH PLASMA INTO PERIPHERAL VEIN, PERCUTANEOUS APPROACH: ICD-10-PCS | Performed by: FAMILY MEDICINE

## 2020-07-06 PROCEDURE — 83735 ASSAY OF MAGNESIUM: CPT

## 2020-07-06 PROCEDURE — 80074 ACUTE HEPATITIS PANEL: CPT

## 2020-07-06 PROCEDURE — 80053 COMPREHEN METABOLIC PANEL: CPT

## 2020-07-06 PROCEDURE — 85025 COMPLETE CBC W/AUTO DIFF WBC: CPT

## 2020-07-06 PROCEDURE — 74011000258 HC RX REV CODE- 258: Performed by: FAMILY MEDICINE

## 2020-07-06 PROCEDURE — 82728 ASSAY OF FERRITIN: CPT

## 2020-07-06 PROCEDURE — 74011250637 HC RX REV CODE- 250/637: Performed by: FAMILY MEDICINE

## 2020-07-06 PROCEDURE — C9113 INJ PANTOPRAZOLE SODIUM, VIA: HCPCS | Performed by: FAMILY MEDICINE

## 2020-07-06 PROCEDURE — 74011000250 HC RX REV CODE- 250: Performed by: FAMILY MEDICINE

## 2020-07-06 PROCEDURE — 82962 GLUCOSE BLOOD TEST: CPT

## 2020-07-06 PROCEDURE — 36430 TRANSFUSION BLD/BLD COMPNT: CPT

## 2020-07-06 PROCEDURE — 65270000029 HC RM PRIVATE

## 2020-07-06 PROCEDURE — 74011000258 HC RX REV CODE- 258: Performed by: INTERNAL MEDICINE

## 2020-07-06 PROCEDURE — 74011636637 HC RX REV CODE- 636/637: Performed by: FAMILY MEDICINE

## 2020-07-06 PROCEDURE — 86140 C-REACTIVE PROTEIN: CPT

## 2020-07-06 PROCEDURE — 36415 COLL VENOUS BLD VENIPUNCTURE: CPT

## 2020-07-06 PROCEDURE — 84100 ASSAY OF PHOSPHORUS: CPT

## 2020-07-06 PROCEDURE — 74011000250 HC RX REV CODE- 250: Performed by: INTERNAL MEDICINE

## 2020-07-06 RX ADMIN — Medication 5 ML: at 22:53

## 2020-07-06 RX ADMIN — Medication 10 ML: at 12:33

## 2020-07-06 RX ADMIN — ASCORBIC ACID 1.5 G: 500 INJECTION, SOLUTION INTRAMUSCULAR; INTRAVENOUS; SUBCUTANEOUS at 05:10

## 2020-07-06 RX ADMIN — REMDESIVIR 200 MG: 100 INJECTION, POWDER, LYOPHILIZED, FOR SOLUTION INTRAVENOUS at 16:55

## 2020-07-06 RX ADMIN — DEXAMETHASONE 6 MG: 4 TABLET ORAL at 09:02

## 2020-07-06 RX ADMIN — Medication 220 MG: at 09:03

## 2020-07-06 RX ADMIN — HUMAN INSULIN 3 UNITS: 100 INJECTION, SOLUTION SUBCUTANEOUS at 16:30

## 2020-07-06 RX ADMIN — Medication 5 ML: at 05:10

## 2020-07-06 RX ADMIN — HUMAN INSULIN 3 UNITS: 100 INJECTION, SOLUTION SUBCUTANEOUS at 22:53

## 2020-07-06 RX ADMIN — HUMAN INSULIN 3 UNITS: 100 INJECTION, SOLUTION SUBCUTANEOUS at 07:30

## 2020-07-06 RX ADMIN — ASCORBIC ACID 1.5 G: 500 INJECTION, SOLUTION INTRAMUSCULAR; INTRAVENOUS; SUBCUTANEOUS at 12:28

## 2020-07-06 RX ADMIN — Medication 10 ML: at 14:00

## 2020-07-06 RX ADMIN — ENOXAPARIN SODIUM 40 MG: 40 INJECTION SUBCUTANEOUS at 22:42

## 2020-07-06 RX ADMIN — SODIUM CHLORIDE 40 MG: 9 INJECTION, SOLUTION INTRAMUSCULAR; INTRAVENOUS; SUBCUTANEOUS at 09:03

## 2020-07-06 RX ADMIN — ASCORBIC ACID 1.5 G: 500 INJECTION, SOLUTION INTRAMUSCULAR; INTRAVENOUS; SUBCUTANEOUS at 17:59

## 2020-07-06 NOTE — PROGRESS NOTES
Outreach for Transitions of Care deferred - patient admitted 7/4/2020 for acute respiratory failure with hypoxemia.   Patient will be re-assigned for NICKI at discharge

## 2020-07-06 NOTE — PROGRESS NOTES
Care Management Interventions  PCP Verified by CM: Yes  Physical Therapy Consult: No  Occupational Therapy Consult: No  Speech Therapy Consult: No  Current Support Network: Relative's Home  Confirm Follow Up Transport: Family  Freedom of Choice List was Provided with Basic Dialogue that Supports the Patient's Individualized Plan of Care/Goals, Treatment Preferences and Shares the Quality Data Associated with the Providers?: Yes  [place] Resource Information Provided?: No  Discharge Location  Discharge Placement: Home  CM spoke with patient's mother. Patient lives with her parents. Her mother takes patient to medical appointments and monitors her medication. Patient is independent with ambulation. No home 02. Verified pcp. CM isn't anticipating any discharge needs but remains available.

## 2020-07-07 LAB
ALBUMIN SERPL-MCNC: 2.6 G/DL (ref 3.5–5)
ALBUMIN/GLOB SERPL: 0.6 {RATIO} (ref 1.2–3.5)
ALP SERPL-CCNC: 52 U/L (ref 50–136)
ALT SERPL-CCNC: 32 U/L (ref 12–65)
ANION GAP SERPL CALC-SCNC: 7 MMOL/L (ref 7–16)
AST SERPL-CCNC: 29 U/L (ref 15–37)
BASOPHILS # BLD: 0 K/UL (ref 0–0.2)
BASOPHILS NFR BLD: 0 % (ref 0–2)
BILIRUB SERPL-MCNC: 0.3 MG/DL (ref 0.2–1.1)
BLD PROD TYP BPU: NORMAL
BLOOD BANK CMNT PATIENT-IMP: NORMAL
BPU ID: NORMAL
BUN SERPL-MCNC: 13 MG/DL (ref 6–23)
CALCIUM SERPL-MCNC: 8.6 MG/DL (ref 8.3–10.4)
CHLORIDE SERPL-SCNC: 111 MMOL/L (ref 98–107)
CO2 SERPL-SCNC: 29 MMOL/L (ref 21–32)
CREAT SERPL-MCNC: 0.76 MG/DL (ref 0.6–1)
DIFFERENTIAL METHOD BLD: ABNORMAL
EOSINOPHIL # BLD: 0 K/UL (ref 0–0.8)
EOSINOPHIL NFR BLD: 0 % (ref 0.5–7.8)
ERYTHROCYTE [DISTWIDTH] IN BLOOD BY AUTOMATED COUNT: 13.3 % (ref 11.9–14.6)
GLOBULIN SER CALC-MCNC: 4.2 G/DL (ref 2.3–3.5)
GLUCOSE BLD STRIP.AUTO-MCNC: 149 MG/DL (ref 65–100)
GLUCOSE BLD STRIP.AUTO-MCNC: 156 MG/DL (ref 65–100)
GLUCOSE BLD STRIP.AUTO-MCNC: 160 MG/DL (ref 65–100)
GLUCOSE BLD STRIP.AUTO-MCNC: 180 MG/DL (ref 65–100)
GLUCOSE BLD STRIP.AUTO-MCNC: 182 MG/DL (ref 65–100)
GLUCOSE SERPL-MCNC: 109 MG/DL (ref 65–100)
HCT VFR BLD AUTO: 40.3 % (ref 35.8–46.3)
HGB BLD-MCNC: 13.2 G/DL (ref 11.7–15.4)
IMM GRANULOCYTES # BLD AUTO: 0.1 K/UL (ref 0–0.5)
IMM GRANULOCYTES NFR BLD AUTO: 1 % (ref 0–5)
LYMPHOCYTES # BLD: 1.7 K/UL (ref 0.5–4.6)
LYMPHOCYTES NFR BLD: 14 % (ref 13–44)
MAGNESIUM SERPL-MCNC: 2.3 MG/DL (ref 1.8–2.4)
MCH RBC QN AUTO: 28.4 PG (ref 26.1–32.9)
MCHC RBC AUTO-ENTMCNC: 32.8 G/DL (ref 31.4–35)
MCV RBC AUTO: 86.9 FL (ref 79.6–97.8)
MONOCYTES # BLD: 0.5 K/UL (ref 0.1–1.3)
MONOCYTES NFR BLD: 5 % (ref 4–12)
NEUTS SEG # BLD: 9.3 K/UL (ref 1.7–8.2)
NEUTS SEG NFR BLD: 80 % (ref 43–78)
NRBC # BLD: 0 K/UL (ref 0–0.2)
PHOSPHATE SERPL-MCNC: 3.4 MG/DL (ref 2.5–4.5)
PLATELET # BLD AUTO: 280 K/UL (ref 150–450)
PMV BLD AUTO: 10.4 FL (ref 9.4–12.3)
POTASSIUM SERPL-SCNC: 3.8 MMOL/L (ref 3.5–5.1)
PROT SERPL-MCNC: 6.8 G/DL (ref 6.3–8.2)
RBC # BLD AUTO: 4.64 M/UL (ref 4.05–5.2)
SODIUM SERPL-SCNC: 147 MMOL/L (ref 136–145)
STATUS OF UNIT,%ST: NORMAL
UNIT DIVISION, %UDIV: NORMAL
WBC # BLD AUTO: 11.6 K/UL (ref 4.3–11.1)

## 2020-07-07 PROCEDURE — 74011250637 HC RX REV CODE- 250/637: Performed by: FAMILY MEDICINE

## 2020-07-07 PROCEDURE — 83735 ASSAY OF MAGNESIUM: CPT

## 2020-07-07 PROCEDURE — 65270000029 HC RM PRIVATE

## 2020-07-07 PROCEDURE — 74011000258 HC RX REV CODE- 258: Performed by: FAMILY MEDICINE

## 2020-07-07 PROCEDURE — 74011250636 HC RX REV CODE- 250/636: Performed by: FAMILY MEDICINE

## 2020-07-07 PROCEDURE — 80053 COMPREHEN METABOLIC PANEL: CPT

## 2020-07-07 PROCEDURE — 85025 COMPLETE CBC W/AUTO DIFF WBC: CPT

## 2020-07-07 PROCEDURE — 74011000250 HC RX REV CODE- 250: Performed by: INTERNAL MEDICINE

## 2020-07-07 PROCEDURE — 36415 COLL VENOUS BLD VENIPUNCTURE: CPT

## 2020-07-07 PROCEDURE — 74011636637 HC RX REV CODE- 636/637: Performed by: FAMILY MEDICINE

## 2020-07-07 PROCEDURE — 84100 ASSAY OF PHOSPHORUS: CPT

## 2020-07-07 PROCEDURE — C9113 INJ PANTOPRAZOLE SODIUM, VIA: HCPCS | Performed by: FAMILY MEDICINE

## 2020-07-07 PROCEDURE — 74011000250 HC RX REV CODE- 250: Performed by: FAMILY MEDICINE

## 2020-07-07 PROCEDURE — 82962 GLUCOSE BLOOD TEST: CPT

## 2020-07-07 PROCEDURE — 74011000258 HC RX REV CODE- 258: Performed by: INTERNAL MEDICINE

## 2020-07-07 RX ORDER — PANTOPRAZOLE SODIUM 40 MG/1
40 TABLET, DELAYED RELEASE ORAL
Status: DISCONTINUED | OUTPATIENT
Start: 2020-07-08 | End: 2020-07-10 | Stop reason: HOSPADM

## 2020-07-07 RX ADMIN — ENOXAPARIN SODIUM 40 MG: 40 INJECTION SUBCUTANEOUS at 19:38

## 2020-07-07 RX ADMIN — Medication 10 ML: at 13:44

## 2020-07-07 RX ADMIN — Medication 220 MG: at 09:01

## 2020-07-07 RX ADMIN — ASCORBIC ACID 1.5 G: 500 INJECTION, SOLUTION INTRAMUSCULAR; INTRAVENOUS; SUBCUTANEOUS at 23:39

## 2020-07-07 RX ADMIN — ASCORBIC ACID 1.5 G: 500 INJECTION, SOLUTION INTRAMUSCULAR; INTRAVENOUS; SUBCUTANEOUS at 12:00

## 2020-07-07 RX ADMIN — HUMAN INSULIN 3 UNITS: 100 INJECTION, SOLUTION SUBCUTANEOUS at 21:55

## 2020-07-07 RX ADMIN — ASCORBIC ACID 1.5 G: 500 INJECTION, SOLUTION INTRAMUSCULAR; INTRAVENOUS; SUBCUTANEOUS at 18:00

## 2020-07-07 RX ADMIN — DEXAMETHASONE 6 MG: 4 TABLET ORAL at 09:00

## 2020-07-07 RX ADMIN — HUMAN INSULIN 3 UNITS: 100 INJECTION, SOLUTION SUBCUTANEOUS at 07:30

## 2020-07-07 RX ADMIN — SODIUM CHLORIDE 40 MG: 9 INJECTION, SOLUTION INTRAMUSCULAR; INTRAVENOUS; SUBCUTANEOUS at 09:01

## 2020-07-07 RX ADMIN — ASCORBIC ACID 1.5 G: 500 INJECTION, SOLUTION INTRAMUSCULAR; INTRAVENOUS; SUBCUTANEOUS at 00:01

## 2020-07-07 RX ADMIN — ASCORBIC ACID 1.5 G: 500 INJECTION, SOLUTION INTRAMUSCULAR; INTRAVENOUS; SUBCUTANEOUS at 07:27

## 2020-07-07 RX ADMIN — Medication 5 ML: at 21:56

## 2020-07-07 RX ADMIN — REMDESIVIR 100 MG: 100 INJECTION, POWDER, LYOPHILIZED, FOR SOLUTION INTRAVENOUS at 16:25

## 2020-07-07 NOTE — INTERDISCIPLINARY ROUNDS
Interdisciplinary team rounds were held 7/7/2020 with the following team members:Care Management, Nursing, Physical Therapy and Physician. Plan of care discussed. See clinical pathway and/or care plan for interventions and desired outcomes.

## 2020-07-07 NOTE — PROGRESS NOTES
Permission given by patient to speak to patient's sister, Aleida Nick. Update given. Sister expressed concern over parents having Covid-19, but they won't get tested. Patient lives with parents.

## 2020-07-07 NOTE — PROGRESS NOTES
Shift Assessment - Patient is alert and oriented x4. No complaint of pain at this time. Respirations are even and unlabored. No wheezing noted to bilateral lungs. Bowel sounds active x4. Currently resting in a low, locked bed. Call light within reach.

## 2020-07-07 NOTE — PROGRESS NOTES
4 hours post plasma transfusion, patient is asymptomatic and no signs of distress. Will continue to monitor.

## 2020-07-07 NOTE — PROGRESS NOTES
Hospitalist Progress Note     Admit Date:  2020  6:40 PM   Name:  Sae Bright   Age:  54 y.o.  :  1965   MRN:  [id number]   PCP:  Addie Ca MD  Treatment Team: Attending Provider: Abel Alberto MD; Utilization Review: Marjorie Ponce RN; Utilization Review: Melissa Sheppard; Care Manager: Victorina Goff.; Consulting Provider: Abel Alberto MD    Subjective:   40-year-old female presenting to [address] [place] [address] emergency department with a complaint of fevers and cough over the last several days. Patient has had multiple family contacts tested positive for COVID-19. Patient found to be hypoxic in the emergency department and admitted for treatment of suspected novel coronavirus infected pneumonia. Rapid COVID test positive. Start dexamethasone, consent for convalescent plasma, consult infectious disease for remdesivir.  patient seen and examined at bedside. On 3L NC oxygen  Doing well. No complaints. convalescent plasma ()  remdesivir ( - 7/10)    Nursing notes and chart reviewed. Review of Systems negative with exception of pertinent positives noted above. Objective:     Patient Vitals for the past 24 hrs:   Temp Pulse Resp BP SpO2   20 1522 97.3 °F (36.3 °C) (!) 55 18 107/45 97 %   20 1116 97.9 °F (36.6 °C) (!) 49 16 112/62 97 %   20 0732 97.2 °F (36.2 °C) (!) 48 17 106/62 93 %   20 0000 97.8 °F (36.6 °C) (!) 55 18 106/65 95 %   20 2030 97.9 °F (36.6 °C) (!) 49 16 93/53 97 %   20 1700 98 °F (36.7 °C) 67 18 102/63 95 %     Oxygen Therapy  O2 Sat (%): 97 % (20 1522)  O2 Device: Nasal cannula (20 0900)  O2 Flow Rate (L/min): 3 l/min (20 0900)    Intake/Output Summary (Last 24 hours) at 2020 1631  Last data filed at 2020 1700  Gross per 24 hour   Intake 250.8 ml   Output    Net 250.8 ml         General:    Well nourished. Alert. On 3L NC oxygen  CV:   RRR.   No murmur, rub, or gallop. Lungs:   Slight right sided crackles. No wheezing, rhonchi, or rales. Abdomen:   Soft, nontender, nondistended. Extremities: Warm and dry. No cyanosis or edema. Skin:     No rashes or jaundice. Data Review:  I have reviewed all labs, meds, telemetry events, and studies from the last 24 hours. Recent Results (from the past 24 hour(s))   GLUCOSE, POC    Collection Time: 07/06/20 10:49 PM   Result Value Ref Range    Glucose (POC) 184 (H) 65 - 100 mg/dL   GLUCOSE, POC    Collection Time: 07/07/20  7:27 AM   Result Value Ref Range    Glucose (POC) 160 (H) 65 - 100 mg/dL   PHOSPHORUS    Collection Time: 07/07/20 12:20 PM   Result Value Ref Range    Phosphorus 3.4 2.5 - 4.5 MG/DL   MAGNESIUM    Collection Time: 07/07/20 12:20 PM   Result Value Ref Range    Magnesium 2.3 1.8 - 2.4 mg/dL   CBC WITH AUTOMATED DIFF    Collection Time: 07/07/20 12:20 PM   Result Value Ref Range    WBC 11.6 (H) 4.3 - 11.1 K/uL    RBC 4.64 4.05 - 5.2 M/uL    HGB 13.2 11.7 - 15.4 g/dL    HCT 40.3 35.8 - 46.3 %    MCV 86.9 79.6 - 97.8 FL    MCH 28.4 26.1 - 32.9 PG    MCHC 32.8 31.4 - 35.0 g/dL    RDW 13.3 11.9 - 14.6 %    PLATELET 156 265 - 556 K/uL    MPV 10.4 9.4 - 12.3 FL    ABSOLUTE NRBC 0.00 0.0 - 0.2 K/uL    DF AUTOMATED      NEUTROPHILS 80 (H) 43 - 78 %    LYMPHOCYTES 14 13 - 44 %    MONOCYTES 5 4.0 - 12.0 %    EOSINOPHILS 0 (L) 0.5 - 7.8 %    BASOPHILS 0 0.0 - 2.0 %    IMMATURE GRANULOCYTES 1 0.0 - 5.0 %    ABS. NEUTROPHILS 9.3 (H) 1.7 - 8.2 K/UL    ABS. LYMPHOCYTES 1.7 0.5 - 4.6 K/UL    ABS. MONOCYTES 0.5 0.1 - 1.3 K/UL    ABS. EOSINOPHILS 0.0 0.0 - 0.8 K/UL    ABS. BASOPHILS 0.0 0.0 - 0.2 K/UL    ABS. IMM.  GRANS. 0.1 0.0 - 0.5 K/UL   METABOLIC PANEL, COMPREHENSIVE    Collection Time: 07/07/20 12:20 PM   Result Value Ref Range    Sodium 147 (H) 136 - 145 mmol/L    Potassium 3.8 3.5 - 5.1 mmol/L    Chloride 111 (H) 98 - 107 mmol/L    CO2 29 21 - 32 mmol/L    Anion gap 7 7 - 16 mmol/L    Glucose 109 (H) 65 - 100 mg/dL BUN 13 6 - 23 MG/DL    Creatinine 0.76 0.6 - 1.0 MG/DL    GFR est AA >60 >60 ml/min/1.73m2    GFR est non-AA >60 >60 ml/min/1.73m2    Calcium 8.6 8.3 - 10.4 MG/DL    Bilirubin, total 0.3 0.2 - 1.1 MG/DL    ALT (SGPT) 32 12 - 65 U/L    AST (SGOT) 29 15 - 37 U/L    Alk. phosphatase 52 50 - 136 U/L    Protein, total 6.8 6.3 - 8.2 g/dL    Albumin 2.6 (L) 3.5 - 5.0 g/dL    Globulin 4.2 (H) 2.3 - 3.5 g/dL    A-G Ratio 0.6 (L) 1.2 - 3.5          All Micro Results     None          Current Meds:  Current Facility-Administered Medications   Medication Dose Route Frequency    [START ON 7/8/2020] pantoprazole (PROTONIX) tablet 40 mg  40 mg Oral ACB    remdesivir 100 mg in 0.9% sodium chloride 250 mL IVPB  100 mg IntraVENous Q24H    ascorbic acid (vitamin C) 1.5 g in dextrose 5% 50 mL IVPB  1.5 g IntraVENous Q6H    zinc sulfate tablet 220 mg  220 mg Oral DAILY    dexAMETHasone (DECADRON) tablet 6 mg  6 mg Oral DAILY    0.9% sodium chloride infusion 250 mL  250 mL IntraVENous PRN    sodium chloride (NS) flush 5-40 mL  5-40 mL IntraVENous Q8H    sodium chloride (NS) flush 5-40 mL  5-40 mL IntraVENous PRN    acetaminophen (TYLENOL) tablet 650 mg  650 mg Oral Q4H PRN    ondansetron (ZOFRAN) injection 4 mg  4 mg IntraVENous Q4H PRN    magnesium hydroxide (MILK OF MAGNESIA) 400 mg/5 mL oral suspension 30 mL  30 mL Oral DAILY PRN    enoxaparin (LOVENOX) injection 40 mg  40 mg SubCUTAneous Q24H    insulin regular (NOVOLIN R, HUMULIN R) injection   SubCUTAneous AC&HS       Other Studies (last 24 hours):  No results found.     Assessment and Plan:     Hospital Problems as of 7/7/2020 Never Reviewed          Codes Class Noted - Resolved POA    COVID-19 ICD-10-CM: U07.1  ICD-9-CM: 079.89  7/4/2020 - Present Yes        * (Principal) Acute respiratory failure with hypoxemia ([facility] [address] ICD-10-CM: J96.01  ICD-9-CM: 518.81  7/4/2020 - Present Yes        Autism disorder ICD-10-CM: F84.0  ICD-9-CM: 299.00  7/4/2020 - Present Yes Diabetes mellitus type 2, controlled ([facility] [address] ICD-10-CM: E11.9  ICD-9-CM: 250.00  7/4/2020 - Present Yes              PLAN:    #Novel coronavirus infected pneumonia:  #Acute respiratory failure with hypoxia  -Spo2 91% on room air in ER  -Continue inpatient  -Dexamethasone oral  -Consented for convalescent plasma (7/6)  -Consult infectious disease for remdesivir (7/6 - 7/10)  -Zinc sulfate  -Ascorbic acid  -Discontinue IVF  -Daily labs     Inpatient for above     Full code    DC planning/Dispo:  Home when able.  Family likely sick as well  DVT ppx:  lovenox    Signed:  Huseyin Cha MD

## 2020-07-08 LAB
ALBUMIN SERPL-MCNC: 2.8 G/DL (ref 3.5–5)
ALBUMIN/GLOB SERPL: 0.6 {RATIO} (ref 1.2–3.5)
ALP SERPL-CCNC: 55 U/L (ref 50–136)
ALT SERPL-CCNC: 33 U/L (ref 12–65)
ANION GAP SERPL CALC-SCNC: 6 MMOL/L (ref 7–16)
AST SERPL-CCNC: 24 U/L (ref 15–37)
BASOPHILS # BLD: 0 K/UL (ref 0–0.2)
BASOPHILS NFR BLD: 0 % (ref 0–2)
BILIRUB SERPL-MCNC: 0.3 MG/DL (ref 0.2–1.1)
BUN SERPL-MCNC: 16 MG/DL (ref 6–23)
CALCIUM SERPL-MCNC: 8.6 MG/DL (ref 8.3–10.4)
CHLORIDE SERPL-SCNC: 108 MMOL/L (ref 98–107)
CO2 SERPL-SCNC: 31 MMOL/L (ref 21–32)
CREAT SERPL-MCNC: 0.78 MG/DL (ref 0.6–1)
DIFFERENTIAL METHOD BLD: ABNORMAL
EOSINOPHIL # BLD: 0 K/UL (ref 0–0.8)
EOSINOPHIL NFR BLD: 0 % (ref 0.5–7.8)
ERYTHROCYTE [DISTWIDTH] IN BLOOD BY AUTOMATED COUNT: 13.2 % (ref 11.9–14.6)
GLOBULIN SER CALC-MCNC: 4.5 G/DL (ref 2.3–3.5)
GLUCOSE BLD STRIP.AUTO-MCNC: 126 MG/DL (ref 65–100)
GLUCOSE BLD STRIP.AUTO-MCNC: 128 MG/DL (ref 65–100)
GLUCOSE BLD STRIP.AUTO-MCNC: 146 MG/DL (ref 65–100)
GLUCOSE BLD STRIP.AUTO-MCNC: 159 MG/DL (ref 65–100)
GLUCOSE BLD STRIP.AUTO-MCNC: 167 MG/DL (ref 65–100)
GLUCOSE SERPL-MCNC: 125 MG/DL (ref 65–100)
HCT VFR BLD AUTO: 42 % (ref 35.8–46.3)
HGB BLD-MCNC: 13.6 G/DL (ref 11.7–15.4)
IMM GRANULOCYTES # BLD AUTO: 0.1 K/UL (ref 0–0.5)
IMM GRANULOCYTES NFR BLD AUTO: 1 % (ref 0–5)
LYMPHOCYTES # BLD: 1.3 K/UL (ref 0.5–4.6)
LYMPHOCYTES NFR BLD: 12 % (ref 13–44)
MAGNESIUM SERPL-MCNC: 2.4 MG/DL (ref 1.8–2.4)
MCH RBC QN AUTO: 28.2 PG (ref 26.1–32.9)
MCHC RBC AUTO-ENTMCNC: 32.4 G/DL (ref 31.4–35)
MCV RBC AUTO: 87 FL (ref 79.6–97.8)
MONOCYTES # BLD: 0.6 K/UL (ref 0.1–1.3)
MONOCYTES NFR BLD: 6 % (ref 4–12)
NEUTS SEG # BLD: 8.8 K/UL (ref 1.7–8.2)
NEUTS SEG NFR BLD: 82 % (ref 43–78)
NRBC # BLD: 0 K/UL (ref 0–0.2)
PHOSPHATE SERPL-MCNC: 4.2 MG/DL (ref 2.5–4.5)
PLATELET # BLD AUTO: 308 K/UL (ref 150–450)
PMV BLD AUTO: 10.4 FL (ref 9.4–12.3)
POTASSIUM SERPL-SCNC: 3.9 MMOL/L (ref 3.5–5.1)
PROT SERPL-MCNC: 7.3 G/DL (ref 6.3–8.2)
RBC # BLD AUTO: 4.83 M/UL (ref 4.05–5.2)
SODIUM SERPL-SCNC: 145 MMOL/L (ref 136–145)
WBC # BLD AUTO: 10.8 K/UL (ref 4.3–11.1)

## 2020-07-08 PROCEDURE — 74011250636 HC RX REV CODE- 250/636: Performed by: FAMILY MEDICINE

## 2020-07-08 PROCEDURE — 74011250637 HC RX REV CODE- 250/637: Performed by: FAMILY MEDICINE

## 2020-07-08 PROCEDURE — 85025 COMPLETE CBC W/AUTO DIFF WBC: CPT

## 2020-07-08 PROCEDURE — 74011000250 HC RX REV CODE- 250: Performed by: INTERNAL MEDICINE

## 2020-07-08 PROCEDURE — 74011000258 HC RX REV CODE- 258: Performed by: INTERNAL MEDICINE

## 2020-07-08 PROCEDURE — 83735 ASSAY OF MAGNESIUM: CPT

## 2020-07-08 PROCEDURE — 74011000250 HC RX REV CODE- 250: Performed by: FAMILY MEDICINE

## 2020-07-08 PROCEDURE — 80053 COMPREHEN METABOLIC PANEL: CPT

## 2020-07-08 PROCEDURE — 74011250637 HC RX REV CODE- 250/637: Performed by: INTERNAL MEDICINE

## 2020-07-08 PROCEDURE — 65270000029 HC RM PRIVATE

## 2020-07-08 PROCEDURE — 74011636637 HC RX REV CODE- 636/637: Performed by: FAMILY MEDICINE

## 2020-07-08 PROCEDURE — 84100 ASSAY OF PHOSPHORUS: CPT

## 2020-07-08 PROCEDURE — 74011000258 HC RX REV CODE- 258: Performed by: FAMILY MEDICINE

## 2020-07-08 RX ADMIN — DEXAMETHASONE 6 MG: 4 TABLET ORAL at 09:20

## 2020-07-08 RX ADMIN — HUMAN INSULIN 3 UNITS: 100 INJECTION, SOLUTION SUBCUTANEOUS at 22:23

## 2020-07-08 RX ADMIN — ASCORBIC ACID 1.5 G: 500 INJECTION, SOLUTION INTRAMUSCULAR; INTRAVENOUS; SUBCUTANEOUS at 17:00

## 2020-07-08 RX ADMIN — ASCORBIC ACID 1.5 G: 500 INJECTION, SOLUTION INTRAMUSCULAR; INTRAVENOUS; SUBCUTANEOUS at 12:00

## 2020-07-08 RX ADMIN — HUMAN INSULIN 3 UNITS: 100 INJECTION, SOLUTION SUBCUTANEOUS at 16:30

## 2020-07-08 RX ADMIN — REMDESIVIR 100 MG: 100 INJECTION, POWDER, LYOPHILIZED, FOR SOLUTION INTRAVENOUS at 16:07

## 2020-07-08 RX ADMIN — Medication 10 ML: at 14:00

## 2020-07-08 RX ADMIN — ASCORBIC ACID 1.5 G: 500 INJECTION, SOLUTION INTRAMUSCULAR; INTRAVENOUS; SUBCUTANEOUS at 05:18

## 2020-07-08 RX ADMIN — ENOXAPARIN SODIUM 40 MG: 40 INJECTION SUBCUTANEOUS at 19:52

## 2020-07-08 RX ADMIN — PANTOPRAZOLE SODIUM 40 MG: 40 TABLET, DELAYED RELEASE ORAL at 09:20

## 2020-07-08 RX ADMIN — Medication 220 MG: at 09:20

## 2020-07-08 RX ADMIN — Medication 5 ML: at 05:18

## 2020-07-08 RX ADMIN — Medication 5 ML: at 22:25

## 2020-07-08 RX ADMIN — ASCORBIC ACID 1.5 G: 500 INJECTION, SOLUTION INTRAMUSCULAR; INTRAVENOUS; SUBCUTANEOUS at 23:27

## 2020-07-08 NOTE — PROGRESS NOTES
Patient is confused so I attempted to contact family to finish admission database and family was not available to complete questions.

## 2020-07-08 NOTE — PROGRESS NOTES
Shift assessment:  Pt alert, oriented to person, place, situation, disoriented to time. On 2 L oxygen NC. Respirations even, unlabored. Lung sounds diminished, crackles at the base bilaterally. No distress noted. HR regular, bradycardic. Abdomen soft, non tender. Bowel sounds active. Denies pain or other needs. Call light within reach. Bed in low, locked position.

## 2020-07-08 NOTE — PROGRESS NOTES
Problem: Risk for Spread of Infection  Goal: Prevent transmission of infectious organism to others  Description: Prevent the transmission of infectious organisms to other patients, staff members, and visitors. Outcome: Progressing Towards Goal     Problem: Patient Education:  Go to Education Activity  Goal: Patient/Family Education  Outcome: Progressing Towards Goal     Problem: Falls - Risk of  Goal: *Absence of Falls  Description: Document Stevo Garvin Fall Risk and appropriate interventions in the flowsheet.   Outcome: Progressing Towards Goal  Note: Fall Risk Interventions:            Medication Interventions: Patient to call before getting OOB                   Problem: Patient Education: Go to Patient Education Activity  Goal: Patient/Family Education  Outcome: Progressing Towards Goal     Problem: Breathing Pattern - Ineffective  Goal: *Absence of hypoxia  Outcome: Progressing Towards Goal  Goal: *Use of effective breathing techniques  Outcome: Progressing Towards Goal  Goal: *PALLIATIVE CARE:  Alleviation of Dyspnea  Outcome: Progressing Towards Goal     Problem: Patient Education: Go to Patient Education Activity  Goal: Patient/Family Education  Outcome: Progressing Towards Goal

## 2020-07-08 NOTE — PROGRESS NOTES
Hospitalist Progress Note     Admit Date:  2020  6:40 PM   Name:  Maryann Carroll   Age:  54 y.o.  :  1965   MRN:  [id number]   PCP:  Mary Anne Portillo MD  Treatment Team: Attending Provider: Rich Tan MD; Utilization Review: Geni Roldan RN; Utilization Review: Gerry Mckenzie; Care Manager: Agnes Aguayo; Consulting Provider: Rich Tan MD; Utilization Review: Devi Kovacs    Subjective:   59-year-old female presenting to [address] [place] [address] emergency department with a complaint of fevers and cough over the last several days. Estela Aviles has had multiple family contacts tested positive for COVID-19.  Patient found to be hypoxic in the emergency department and admitted for treatment of suspected novel coronavirus infected pneumonia.  Rapid COVID test positive.  Start dexamethasone, consent for convalescent plasma, consult infectious disease for remdesivir.      patient seen and examined at bedside.      On 3L NC oxygen  Doing well. No complaints. convalescent plasma ()  remdesivir ( - 7/10)    Nursing notes and chart reviewed. Review of Systems negative with exception of pertinent positives noted above. Objective:     Patient Vitals for the past 24 hrs:   Temp Pulse Resp BP SpO2   20 1525 98.1 °F (36.7 °C) (!) 50 15 111/57 92 %   20 1127 97.9 °F (36.6 °C) (!) 44 16 130/74 93 %   20 0728 97.5 °F (36.4 °C) (!) 45 16 123/73 96 %   20 0358 97.4 °F (36.3 °C) (!) 48 15 117/54 98 %   20 2340 97.8 °F (36.6 °C) (!) 50 17 134/76 93 %   20 1941 97.4 °F (36.3 °C) (!) 51 16 121/75 96 %     Oxygen Therapy  O2 Sat (%): 92 % (20 1525)  O2 Device: Nasal cannula (20 0900)  O2 Flow Rate (L/min): 3 l/min (20 0900)    Intake/Output Summary (Last 24 hours) at 2020 1615  Last data filed at 2020 2340  Gross per 24 hour   Intake 220 ml   Output    Net 220 ml         General:    Well nourished. Alert.   On 3L NC oxygen. CV:   RRR. No murmur, rub, or gallop. Lungs:   CTAB. Poor inspiratory effort  Abdomen:   Soft, nontender, nondistended. Extremities: Warm and dry. No cyanosis or edema. Skin:     No rashes or jaundice. Data Review:  I have reviewed all labs, meds, telemetry events, and studies from the last 24 hours. Recent Results (from the past 24 hour(s))   GLUCOSE, POC    Collection Time: 07/07/20  4:29 PM   Result Value Ref Range    Glucose (POC) 149 (H) 65 - 100 mg/dL   GLUCOSE, POC    Collection Time: 07/07/20  9:29 PM   Result Value Ref Range    Glucose (POC) 182 (H) 65 - 100 mg/dL   PHOSPHORUS    Collection Time: 07/08/20  4:35 AM   Result Value Ref Range    Phosphorus 4.2 2.5 - 4.5 MG/DL   MAGNESIUM    Collection Time: 07/08/20  4:35 AM   Result Value Ref Range    Magnesium 2.4 1.8 - 2.4 mg/dL   CBC WITH AUTOMATED DIFF    Collection Time: 07/08/20  4:35 AM   Result Value Ref Range    WBC 10.8 4.3 - 11.1 K/uL    RBC 4.83 4.05 - 5.2 M/uL    HGB 13.6 11.7 - 15.4 g/dL    HCT 42.0 35.8 - 46.3 %    MCV 87.0 79.6 - 97.8 FL    MCH 28.2 26.1 - 32.9 PG    MCHC 32.4 31.4 - 35.0 g/dL    RDW 13.2 11.9 - 14.6 %    PLATELET 624 314 - 430 K/uL    MPV 10.4 9.4 - 12.3 FL    ABSOLUTE NRBC 0.00 0.0 - 0.2 K/uL    DF AUTOMATED      NEUTROPHILS 82 (H) 43 - 78 %    LYMPHOCYTES 12 (L) 13 - 44 %    MONOCYTES 6 4.0 - 12.0 %    EOSINOPHILS 0 (L) 0.5 - 7.8 %    BASOPHILS 0 0.0 - 2.0 %    IMMATURE GRANULOCYTES 1 0.0 - 5.0 %    ABS. NEUTROPHILS 8.8 (H) 1.7 - 8.2 K/UL    ABS. LYMPHOCYTES 1.3 0.5 - 4.6 K/UL    ABS. MONOCYTES 0.6 0.1 - 1.3 K/UL    ABS. EOSINOPHILS 0.0 0.0 - 0.8 K/UL    ABS. BASOPHILS 0.0 0.0 - 0.2 K/UL    ABS. IMM.  GRANS. 0.1 0.0 - 0.5 K/UL   METABOLIC PANEL, COMPREHENSIVE    Collection Time: 07/08/20  4:35 AM   Result Value Ref Range    Sodium 145 136 - 145 mmol/L    Potassium 3.9 3.5 - 5.1 mmol/L    Chloride 108 (H) 98 - 107 mmol/L    CO2 31 21 - 32 mmol/L    Anion gap 6 (L) 7 - 16 mmol/L    Glucose 125 (H) 65 - 100 mg/dL    BUN 16 6 - 23 MG/DL    Creatinine 0.78 0.6 - 1.0 MG/DL    GFR est AA >60 >60 ml/min/1.73m2    GFR est non-AA >60 >60 ml/min/1.73m2    Calcium 8.6 8.3 - 10.4 MG/DL    Bilirubin, total 0.3 0.2 - 1.1 MG/DL    ALT (SGPT) 33 12 - 65 U/L    AST (SGOT) 24 15 - 37 U/L    Alk. phosphatase 55 50 - 136 U/L    Protein, total 7.3 6.3 - 8.2 g/dL    Albumin 2.8 (L) 3.5 - 5.0 g/dL    Globulin 4.5 (H) 2.3 - 3.5 g/dL    A-G Ratio 0.6 (L) 1.2 - 3.5     GLUCOSE, POC    Collection Time: 07/08/20  7:25 AM   Result Value Ref Range    Glucose (POC) 146 (H) 65 - 100 mg/dL   GLUCOSE, POC    Collection Time: 07/08/20 11:24 AM   Result Value Ref Range    Glucose (POC) 128 (H) 65 - 100 mg/dL        All Micro Results     None          Current Meds:  Current Facility-Administered Medications   Medication Dose Route Frequency    pantoprazole (PROTONIX) tablet 40 mg  40 mg Oral ACB    remdesivir 100 mg in 0.9% sodium chloride 250 mL IVPB  100 mg IntraVENous Q24H    ascorbic acid (vitamin C) 1.5 g in dextrose 5% 50 mL IVPB  1.5 g IntraVENous Q6H    zinc sulfate tablet 220 mg  220 mg Oral DAILY    dexAMETHasone (DECADRON) tablet 6 mg  6 mg Oral DAILY    0.9% sodium chloride infusion 250 mL  250 mL IntraVENous PRN    sodium chloride (NS) flush 5-40 mL  5-40 mL IntraVENous Q8H    sodium chloride (NS) flush 5-40 mL  5-40 mL IntraVENous PRN    acetaminophen (TYLENOL) tablet 650 mg  650 mg Oral Q4H PRN    ondansetron (ZOFRAN) injection 4 mg  4 mg IntraVENous Q4H PRN    magnesium hydroxide (MILK OF MAGNESIA) 400 mg/5 mL oral suspension 30 mL  30 mL Oral DAILY PRN    enoxaparin (LOVENOX) injection 40 mg  40 mg SubCUTAneous Q24H    insulin regular (NOVOLIN R, HUMULIN R) injection   SubCUTAneous AC&HS       Other Studies (last 24 hours):  No results found.     Assessment and Plan:     Hospital Problems as of 7/8/2020 Never Reviewed          Codes Class Noted - Resolved POA    URVDZ-12 ICD-10-CM: U07.1  ICD-9-CM: 079.89  7/4/2020 - Present Yes        * (Principal) Acute respiratory failure with hypoxemia [facility]) ICD-10-CM: J96.01  ICD-9-CM: 518.81  7/4/2020 - Present Yes        Autism disorder ICD-10-CM: F84.0  ICD-9-CM: 299.00  7/4/2020 - Present Yes        Diabetes mellitus type 2, controlled ([facility] [address] ICD-10-CM: E11.9  ICD-9-CM: 250.00  7/4/2020 - Present Yes              PLAN:    #Novel coronavirus infected pneumonia:  #Acute respiratory failure with hypoxia  -Spo2 91% on room air in ER  -Continue inpatient  -Dexamethasone oral  -Consented for convalescent plasma (7/6)  -Consult infectious disease for remdesivir (7/6 - 7/10)  -Zinc sulfate  -Ascorbic acid  -Discontinue IVF  -Daily labs     Inpatient for above     Full code     DC planning/Dispo:  Home when able.  Family likely sick as well  DVT ppx:  lovenox    Signed:  Dora Moreau MD

## 2020-07-08 NOTE — INTERDISCIPLINARY ROUNDS
Interdisciplinary team rounds were held 7/8/2020 with the following team members:Care Management, Nursing and Physician. Plan of care discussed. See clinical pathway and/or care plan for interventions and desired outcomes.

## 2020-07-08 NOTE — CDMP QUERY
Patient admitted with covid. Noted documentation of \"acute respiratory failure\" on H&P and p/n. Kamron Jung Please address the documentation of this diagnosis in one of the following ways in medical record:   
 
? -Please provide the clinical indicators (such as the ones below) to support the documented diagnosis of Acute Respiratory Failure Air hunger/gasping; Use of accessory muscles of respiration/increased work of breathing; Sternal or intercostal retractions; Stridor; Inability to speak in full sentences; Cyanosis; Respirations <12 or >25; Pulse ox <90% RA or <95% on O2; pH <7.35 or >7.45; pO2 < 60 mm Hg (or 10mm below COPD patient's baseline); pCO2 >50mm Hg (or 10mm above COPD patient's baseline) ? -Acute Respiratory Failure ruled out after study ? -Other, please specify ? -Unable to determine The medical record reflects the following: 
   Risk Factors: COVID positive Clinical Indicators: No ABGS. RR 15-19. As per H&P physical exam \" lungs: no respiratory distress or accessory muscle use\" On 7/6 p/n \"non-labored respirations on 2lpm via NC, LCAB, no wheezing, no rales, no rhonchi\" Treatment: Supplemental O2 at 3l/min via NC Thank You, Angelita Herrera RN CDS 
CDI

## 2020-07-09 PROBLEM — E78.5 HLD (HYPERLIPIDEMIA): Status: ACTIVE | Noted: 2020-07-09

## 2020-07-09 PROBLEM — E03.9 HYPOTHYROID: Status: ACTIVE | Noted: 2020-07-09

## 2020-07-09 LAB
ALBUMIN SERPL-MCNC: 3 G/DL (ref 3.5–5)
ALBUMIN/GLOB SERPL: 0.7 {RATIO} (ref 1.2–3.5)
ALP SERPL-CCNC: 55 U/L (ref 50–136)
ALT SERPL-CCNC: 40 U/L (ref 12–65)
ANION GAP SERPL CALC-SCNC: 7 MMOL/L (ref 7–16)
AST SERPL-CCNC: 32 U/L (ref 15–37)
BASOPHILS # BLD: 0 K/UL (ref 0–0.2)
BASOPHILS NFR BLD: 0 % (ref 0–2)
BILIRUB SERPL-MCNC: 0.4 MG/DL (ref 0.2–1.1)
BUN SERPL-MCNC: 17 MG/DL (ref 6–23)
CALCIUM SERPL-MCNC: 8.6 MG/DL (ref 8.3–10.4)
CHLORIDE SERPL-SCNC: 108 MMOL/L (ref 98–107)
CO2 SERPL-SCNC: 31 MMOL/L (ref 21–32)
CREAT SERPL-MCNC: 0.83 MG/DL (ref 0.6–1)
DIFFERENTIAL METHOD BLD: ABNORMAL
EOSINOPHIL # BLD: 0 K/UL (ref 0–0.8)
EOSINOPHIL NFR BLD: 0 % (ref 0.5–7.8)
ERYTHROCYTE [DISTWIDTH] IN BLOOD BY AUTOMATED COUNT: 13 % (ref 11.9–14.6)
GLOBULIN SER CALC-MCNC: 4.1 G/DL (ref 2.3–3.5)
GLUCOSE BLD STRIP.AUTO-MCNC: 109 MG/DL (ref 65–100)
GLUCOSE BLD STRIP.AUTO-MCNC: 110 MG/DL (ref 65–100)
GLUCOSE BLD STRIP.AUTO-MCNC: 112 MG/DL (ref 65–100)
GLUCOSE BLD STRIP.AUTO-MCNC: 118 MG/DL (ref 65–100)
GLUCOSE SERPL-MCNC: 104 MG/DL (ref 65–100)
HCT VFR BLD AUTO: 40.3 % (ref 35.8–46.3)
HGB BLD-MCNC: 13.2 G/DL (ref 11.7–15.4)
IMM GRANULOCYTES # BLD AUTO: 0.2 K/UL (ref 0–0.5)
IMM GRANULOCYTES NFR BLD AUTO: 2 % (ref 0–5)
LYMPHOCYTES # BLD: 1.8 K/UL (ref 0.5–4.6)
LYMPHOCYTES NFR BLD: 16 % (ref 13–44)
MAGNESIUM SERPL-MCNC: 2.5 MG/DL (ref 1.8–2.4)
MCH RBC QN AUTO: 28.3 PG (ref 26.1–32.9)
MCHC RBC AUTO-ENTMCNC: 32.8 G/DL (ref 31.4–35)
MCV RBC AUTO: 86.3 FL (ref 79.6–97.8)
MONOCYTES # BLD: 0.9 K/UL (ref 0.1–1.3)
MONOCYTES NFR BLD: 8 % (ref 4–12)
NEUTS SEG # BLD: 8.6 K/UL (ref 1.7–8.2)
NEUTS SEG NFR BLD: 75 % (ref 43–78)
NRBC # BLD: 0 K/UL (ref 0–0.2)
PHOSPHATE SERPL-MCNC: 3.4 MG/DL (ref 2.5–4.5)
PLATELET # BLD AUTO: 378 K/UL (ref 150–450)
PMV BLD AUTO: 10.4 FL (ref 9.4–12.3)
POTASSIUM SERPL-SCNC: 3.5 MMOL/L (ref 3.5–5.1)
PROT SERPL-MCNC: 7.1 G/DL (ref 6.3–8.2)
RBC # BLD AUTO: 4.67 M/UL (ref 4.05–5.2)
SODIUM SERPL-SCNC: 146 MMOL/L (ref 136–145)
WBC # BLD AUTO: 11.5 K/UL (ref 4.3–11.1)

## 2020-07-09 PROCEDURE — 74011250637 HC RX REV CODE- 250/637: Performed by: INTERNAL MEDICINE

## 2020-07-09 PROCEDURE — 74011000250 HC RX REV CODE- 250: Performed by: INTERNAL MEDICINE

## 2020-07-09 PROCEDURE — 74011250636 HC RX REV CODE- 250/636: Performed by: FAMILY MEDICINE

## 2020-07-09 PROCEDURE — 65270000029 HC RM PRIVATE

## 2020-07-09 PROCEDURE — 74011000258 HC RX REV CODE- 258: Performed by: FAMILY MEDICINE

## 2020-07-09 PROCEDURE — 83735 ASSAY OF MAGNESIUM: CPT

## 2020-07-09 PROCEDURE — 74011250637 HC RX REV CODE- 250/637: Performed by: FAMILY MEDICINE

## 2020-07-09 PROCEDURE — 74011000250 HC RX REV CODE- 250: Performed by: FAMILY MEDICINE

## 2020-07-09 PROCEDURE — 85025 COMPLETE CBC W/AUTO DIFF WBC: CPT

## 2020-07-09 PROCEDURE — 74011000258 HC RX REV CODE- 258: Performed by: INTERNAL MEDICINE

## 2020-07-09 PROCEDURE — 84100 ASSAY OF PHOSPHORUS: CPT

## 2020-07-09 PROCEDURE — 80053 COMPREHEN METABOLIC PANEL: CPT

## 2020-07-09 PROCEDURE — 82962 GLUCOSE BLOOD TEST: CPT

## 2020-07-09 RX ORDER — LEVOTHYROXINE SODIUM 50 UG/1
50 TABLET ORAL
COMMUNITY

## 2020-07-09 RX ORDER — LEVOTHYROXINE SODIUM 50 UG/1
50 TABLET ORAL
Status: DISCONTINUED | OUTPATIENT
Start: 2020-07-10 | End: 2020-07-10 | Stop reason: HOSPADM

## 2020-07-09 RX ORDER — METFORMIN HYDROCHLORIDE 500 MG/1
500 TABLET, FILM COATED, EXTENDED RELEASE ORAL
COMMUNITY

## 2020-07-09 RX ORDER — PRAVASTATIN SODIUM 20 MG/1
20 TABLET ORAL
COMMUNITY

## 2020-07-09 RX ORDER — PRAVASTATIN SODIUM 20 MG/1
20 TABLET ORAL
Status: DISCONTINUED | OUTPATIENT
Start: 2020-07-09 | End: 2020-07-10 | Stop reason: HOSPADM

## 2020-07-09 RX ADMIN — REMDESIVIR 100 MG: 100 INJECTION, POWDER, LYOPHILIZED, FOR SOLUTION INTRAVENOUS at 16:24

## 2020-07-09 RX ADMIN — DEXAMETHASONE 6 MG: 4 TABLET ORAL at 08:41

## 2020-07-09 RX ADMIN — Medication 220 MG: at 08:41

## 2020-07-09 RX ADMIN — PANTOPRAZOLE SODIUM 40 MG: 40 TABLET, DELAYED RELEASE ORAL at 06:37

## 2020-07-09 RX ADMIN — ASCORBIC ACID 1.5 G: 500 INJECTION, SOLUTION INTRAMUSCULAR; INTRAVENOUS; SUBCUTANEOUS at 05:12

## 2020-07-09 RX ADMIN — ASCORBIC ACID 1.5 G: 500 INJECTION, SOLUTION INTRAMUSCULAR; INTRAVENOUS; SUBCUTANEOUS at 17:38

## 2020-07-09 RX ADMIN — Medication 10 ML: at 14:07

## 2020-07-09 RX ADMIN — PRAVASTATIN SODIUM 20 MG: 20 TABLET ORAL at 21:25

## 2020-07-09 RX ADMIN — Medication 5 ML: at 05:12

## 2020-07-09 RX ADMIN — ASCORBIC ACID 1.5 G: 500 INJECTION, SOLUTION INTRAMUSCULAR; INTRAVENOUS; SUBCUTANEOUS at 11:37

## 2020-07-09 RX ADMIN — Medication 10 ML: at 21:25

## 2020-07-09 RX ADMIN — ENOXAPARIN SODIUM 40 MG: 40 INJECTION SUBCUTANEOUS at 21:25

## 2020-07-09 NOTE — DISCHARGE INSTRUCTIONS
Advance Care Planning  People with COVID-19 may have no symptoms, mild symptoms, such as fever, cough, and shortness of breath or they may have more severe illness, developing severe and fatal pneumonia. As a result, Advance Care Planning with attention to naming a health care decision maker (someone you trust to make healthcare decisions for you if you could not speak for yourself) and sharing other health care preferences is important BEFORE a possible health crisis. Please contact your Primary Care Provider to discuss Advance Care Planning. Preventing the Spread of Coronavirus Disease 2019 in Homes and Residential Communities  For the most recent information go to [email or web address]    Prevention steps for People with confirmed or suspected COVID-19 (including persons under investigation) who do not need to be hospitalized  and   People with confirmed COVID-19 who were hospitalized and determined to be medically stable to go home    Your healthcare provider and public health staff will evaluate whether you can be cared for at home. If it is determined that you do not need to be hospitalized and can be isolated at home, you will be monitored by staff from your local or state health department. You should follow the prevention steps below until a healthcare provider or local or state health department says you can return to your normal activities. Stay home except to get medical care  People who are mildly ill with COVID-19 are able to isolate at home during their illness. You should restrict activities outside your home, except for getting medical care. Do not go to work, school, or public areas. Avoid using public transportation, ride-sharing, or taxis. Separate yourself from other people and animals in your home  People: As much as possible, you should stay in a specific room and away from other people in your home.  Also, you should use a separate bathroom, if available. Animals: You should restrict contact with pets and other animals while you are sick with COVID-19, just like you would around other people. Although there have not been reports of pets or other animals becoming sick with COVID-19, it is still recommended that people sick with COVID-19 limit contact with animals until more information is known about the virus. When possible, have another member of your household care for your animals while you are sick. If you are sick with COVID-19, avoid contact with your pet, including petting, snuggling, being kissed or licked, and sharing food. If you must care for your pet or be around animals while you are sick, wash your hands before and after you interact with pets and wear a facemask. Call ahead before visiting your doctor  If you have a medical appointment, call the healthcare provider and tell them that you have or may have COVID-19. This will help the healthcare providers office take steps to keep other people from getting infected or exposed. Wear a facemask  You should wear a facemask when you are around other people (e.g., sharing a room or vehicle) or pets and before you enter a healthcare providers office. If you are not able to wear a facemask (for example, because it causes trouble breathing), then people who live with you should not stay in the same room with you, or they should wear a facemask if they enter your room. Cover your coughs and sneezes  Cover your mouth and nose with a tissue when you cough or sneeze. Throw used tissues in a lined trash can. Immediately wash your hands with soap and water for at least 20 seconds or, if soap and water are not available, clean your hands with an alcohol-based hand  that contains at least 60% alcohol.   Clean your hands often  Wash your hands often with soap and water for at least 20 seconds, especially after blowing your nose, coughing, or sneezing; going to the bathroom; and before eating or preparing food. If soap and water are not readily available, use an alcohol-based hand  with at least 60% alcohol, covering all surfaces of your hands and rubbing them together until they feel dry. Soap and water are the best option if hands are visibly dirty. Avoid touching your eyes, nose, and mouth with unwashed hands. Avoid sharing personal household items  You should not share dishes, drinking glasses, cups, eating utensils, towels, or bedding with other people or pets in your home. After using these items, they should be washed thoroughly with soap and water. Clean all high-touch surfaces everyday  High touch surfaces include counters, tabletops, doorknobs, bathroom fixtures, toilets, phones, keyboards, tablets, and bedside tables. Also, clean any surfaces that may have blood, stool, or body fluids on them. Use a household cleaning spray or wipe, according to the label instructions. Labels contain instructions for safe and effective use of the cleaning product including precautions you should take when applying the product, such as wearing gloves and making sure you have good ventilation during use of the product. Monitor your symptoms  Seek prompt medical attention if your illness is worsening (e.g., difficulty breathing). Before seeking care, call your healthcare provider and tell them that you have, or are being evaluated for, COVID-19. Put on a facemask before you enter the facility. These steps will help the healthcare providers office to keep other people in the office or waiting room from getting infected or exposed. Ask your healthcare provider to call the local or state health department. Persons who are placed under active monitoring or facilitated self-monitoring should follow instructions provided by their local health department or occupational health professionals, as appropriate. When working with your local health department check their available hours.   If you have a medical emergency and need to call 911, notify the dispatch personnel that you have, or are being evaluated for COVID-19. If possible, put on a facemask before emergency medical services arrive. Discontinuing home isolation  Patients with confirmed COVID-19 should remain under home isolation precautions until the risk of secondary transmission to others is thought to be low. The decision to discontinue home isolation precautions should be made on a case-by-case basis, in consultation with healthcare providers and state and local health departments. Patient Education        Learning About Coronavirus [phone])  Coronavirus [phone]): Overview  What is coronavirus ([id number])? The coronavirus disease (COVID-19) is caused by a virus. It is an illness that was first found in [place], [place], in December 2019. It has since spread worldwide. The virus can cause fever, cough, and trouble breathing. In severe cases, it can cause pneumonia and make it hard to breathe without help. It can cause death. Coronaviruses are a large group of viruses. They cause the common cold. They also cause more serious illnesses like Middle East respiratory syndrome (MERS) and severe acute respiratory syndrome (SARS). COVID-19 is caused by a novel coronavirus. That means it's a new type that has not been seen in people before. This virus spreads person-to-person through droplets from coughing and sneezing. It can also spread when you are close to someone who is infected. And it can spread when you touch something that has the virus on it, such as a doorknob or a tabletop. What can you do to protect yourself from coronavirus (COVID-19)? The best way to protect yourself from getting sick is to:  · Avoid areas where there is an outbreak. · Avoid contact with people who may be infected. · Wash your hands often with soap or alcohol-based hand sanitizers.   · Avoid crowds and try to stay at least 6 feet away from other people. · Wash your hands often, especially after you cough or sneeze. Use soap and water, and scrub for at least 20 seconds. If soap and water aren't available, use an alcohol-based hand . · Avoid touching your mouth, nose, and eyes. What can you do to avoid spreading the virus to others? To help avoid spreading the virus to others:  · Cover your mouth with a tissue when you cough or sneeze. Then throw the tissue in the trash. · Use a disinfectant to clean things that you touch often. · Wear a cloth face cover if you have to go to public areas. · Stay home if you are sick or have been exposed to the virus. Don't go to school, work, or public areas. And don't use public transportation, ride-shares, or taxis unless you have no choice. · If you are sick:  ? Leave your home only if you need to get medical care. But call the doctor's office first so they know you're coming. And wear a face cover. ? Wear the face cover whenever you're around other people. It can help stop the spread of the virus when you cough or sneeze. ? Clean and disinfect your home every day. Use household  and disinfectant wipes or sprays. Take special care to clean things that you grab with your hands. These include doorknobs, remote controls, phones, and handles on your refrigerator and microwave. And don't forget countertops, tabletops, bathrooms, and computer keyboards. When to call for help  [id number] anytime you think you may need emergency care. For example, call if:  · You have severe trouble breathing. (You can't talk at all.)  · You have constant chest pain or pressure. · You are severely dizzy or lightheaded. · You are confused or can't think clearly. · Your face and lips have a blue color. · You pass out (lose consciousness) or are very hard to wake up. Call your doctor now if you develop symptoms such as:  · Shortness of breath. · Fever. · Cough.   If you need to get care, call ahead to the doctor's office for instructions before you go. Make sure you wear a face cover to prevent exposing other people to the virus. Where can you get the latest information? The following health organizations are tracking and studying this virus. Their websites contain the most up-to-date information. Lyudmila Huitron also learn what to do if you think you may have been exposed to the virus. · U.S. Centers for Disease Control and Prevention (CDC): The CDC provides updated news about the disease and travel advice. The website also tells you how to prevent the spread of infection. www.cdc.gov  · World Health Organization [facility]): WHO offers information about the virus outbreaks. WHO also has travel advice. www.who.int  Current as of: May 8, 2020               Content Version: 12.5  © 2006-2020 Healthwise[employer]. Care instructions adapted under license by [employer] (which disclaims liability or warranty for this information). If you have questions about a medical condition or this instruction, always ask your healthcare professional. [address] any warranty or liability for your use of this information.

## 2020-07-09 NOTE — PROGRESS NOTES
Hospitalist Progress Note     Admit Date:  2020  6:40 PM   Name:  Koffi Robin   Age:  54 y.o.  :  1965   MRN:  [id number]   PCP:  Mercedez Alexander MD  Treatment Team: Attending Provider: Sherly Couch MD; Utilization Review: Kofi Rao RN; Utilization Review: Jatin Lagos; Care Manager: Tico Avelar; Consulting Provider: Sherly Couch MD; Utilization Review: Anayeli Driver; Primary Nurse: Colten Montana    Subjective:   80-year-old female presenting to [address] [place] [address] emergency department with a complaint of fevers and cough over the last several days.  Patient has had multiple family contacts tested positive for COVID-19.  Patient found to be hypoxic in the emergency department and admitted for treatment of suspected novel coronavirus infected pneumonia.  Rapid COVID test positive.  Start dexamethasone, consent for convalescent plasma, consult infectious disease for remdesivir.      patient seen and examined at bedside. Doing well     On 3L NC oxygen  Doing well. No complaints.   convalescent plasma ()  remdesivir ( - 7/10)     Nursing notes and chart reviewed. Review of Systems negative with exception of pertinent positives noted above. Objective:     Patient Vitals for the past 24 hrs:   Temp Pulse Resp BP SpO2   20 0738 97.7 °F (36.5 °C) (!) 45 16 124/65 97 %   20 0438 97.4 °F (36.3 °C) (!) 52 16 139/63 96 %   20 0109 97.8 °F (36.6 °C) 64 18 111/61 98 %   20 2238  (!) 52      20 2103 97.8 °F (36.6 °C) (!) 46 14 115/57 97 %   20 1525 98.1 °F (36.7 °C) (!) 50 15 111/57 92 %     Oxygen Therapy  O2 Sat (%): 97 % (20 0738)  O2 Device: Nasal cannula (20 09)  O2 Flow Rate (L/min): 3 l/min (20 09)    Intake/Output Summary (Last 24 hours) at 2020 9328  Last data filed at 2020 0438  Gross per 24 hour   Intake 120 ml   Output    Net 120 ml         General:    Well nourished. Alert.  On 3L NC oxygen  CV:   RRR. No murmur, rub, or gallop. Lungs:   CTAB. No wheezing, rhonchi, or rales. Abdomen:   Soft, nontender, nondistended. Extremities: Warm and dry. No cyanosis or edema. Skin:     No rashes or jaundice. Data Review:  I have reviewed all labs, meds, telemetry events, and studies from the last 24 hours. Recent Results (from the past 24 hour(s))   GLUCOSE, POC    Collection Time: 07/08/20  4:30 PM   Result Value Ref Range    Glucose (POC) 159 (H) 65 - 100 mg/dL   GLUCOSE, POC    Collection Time: 07/08/20  9:04 PM   Result Value Ref Range    Glucose (POC) 167 (H) 65 - 100 mg/dL   PHOSPHORUS    Collection Time: 07/09/20  4:41 AM   Result Value Ref Range    Phosphorus 3.4 2.5 - 4.5 MG/DL   MAGNESIUM    Collection Time: 07/09/20  4:41 AM   Result Value Ref Range    Magnesium 2.5 (H) 1.8 - 2.4 mg/dL   CBC WITH AUTOMATED DIFF    Collection Time: 07/09/20  4:41 AM   Result Value Ref Range    WBC 11.5 (H) 4.3 - 11.1 K/uL    RBC 4.67 4.05 - 5.2 M/uL    HGB 13.2 11.7 - 15.4 g/dL    HCT 40.3 35.8 - 46.3 %    MCV 86.3 79.6 - 97.8 FL    MCH 28.3 26.1 - 32.9 PG    MCHC 32.8 31.4 - 35.0 g/dL    RDW 13.0 11.9 - 14.6 %    PLATELET 660 741 - 467 K/uL    MPV 10.4 9.4 - 12.3 FL    ABSOLUTE NRBC 0.00 0.0 - 0.2 K/uL    DF AUTOMATED      NEUTROPHILS 75 43 - 78 %    LYMPHOCYTES 16 13 - 44 %    MONOCYTES 8 4.0 - 12.0 %    EOSINOPHILS 0 (L) 0.5 - 7.8 %    BASOPHILS 0 0.0 - 2.0 %    IMMATURE GRANULOCYTES 2 0.0 - 5.0 %    ABS. NEUTROPHILS 8.6 (H) 1.7 - 8.2 K/UL    ABS. LYMPHOCYTES 1.8 0.5 - 4.6 K/UL    ABS. MONOCYTES 0.9 0.1 - 1.3 K/UL    ABS. EOSINOPHILS 0.0 0.0 - 0.8 K/UL    ABS. BASOPHILS 0.0 0.0 - 0.2 K/UL    ABS. IMM.  GRANS. 0.2 0.0 - 0.5 K/UL   METABOLIC PANEL, COMPREHENSIVE    Collection Time: 07/09/20  4:41 AM   Result Value Ref Range    Sodium 146 (H) 136 - 145 mmol/L    Potassium 3.5 3.5 - 5.1 mmol/L    Chloride 108 (H) 98 - 107 mmol/L    CO2 31 21 - 32 mmol/L    Anion gap 7 7 - 16 mmol/L Glucose 104 (H) 65 - 100 mg/dL    BUN 17 6 - 23 MG/DL    Creatinine 0.83 0.6 - 1.0 MG/DL    GFR est AA >60 >60 ml/min/1.73m2    GFR est non-AA >60 >60 ml/min/1.73m2    Calcium 8.6 8.3 - 10.4 MG/DL    Bilirubin, total 0.4 0.2 - 1.1 MG/DL    ALT (SGPT) 40 12 - 65 U/L    AST (SGOT) 32 15 - 37 U/L    Alk. phosphatase 55 50 - 136 U/L    Protein, total 7.1 6.3 - 8.2 g/dL    Albumin 3.0 (L) 3.5 - 5.0 g/dL    Globulin 4.1 (H) 2.3 - 3.5 g/dL    A-G Ratio 0.7 (L) 1.2 - 3.5     GLUCOSE, POC    Collection Time: 07/09/20  7:35 AM   Result Value Ref Range    Glucose (POC) 118 (H) 65 - 100 mg/dL        All Micro Results     None          Current Meds:  Current Facility-Administered Medications   Medication Dose Route Frequency    pantoprazole (PROTONIX) tablet 40 mg  40 mg Oral ACB    remdesivir 100 mg in 0.9% sodium chloride 250 mL IVPB  100 mg IntraVENous Q24H    ascorbic acid (vitamin C) 1.5 g in dextrose 5% 50 mL IVPB  1.5 g IntraVENous Q6H    zinc sulfate tablet 220 mg  220 mg Oral DAILY    dexAMETHasone (DECADRON) tablet 6 mg  6 mg Oral DAILY    0.9% sodium chloride infusion 250 mL  250 mL IntraVENous PRN    sodium chloride (NS) flush 5-40 mL  5-40 mL IntraVENous Q8H    sodium chloride (NS) flush 5-40 mL  5-40 mL IntraVENous PRN    acetaminophen (TYLENOL) tablet 650 mg  650 mg Oral Q4H PRN    ondansetron (ZOFRAN) injection 4 mg  4 mg IntraVENous Q4H PRN    magnesium hydroxide (MILK OF MAGNESIA) 400 mg/5 mL oral suspension 30 mL  30 mL Oral DAILY PRN    enoxaparin (LOVENOX) injection 40 mg  40 mg SubCUTAneous Q24H    insulin regular (NOVOLIN R, HUMULIN R) injection   SubCUTAneous AC&HS       Other Studies (last 24 hours):  No results found.     Assessment and Plan:     Hospital Problems as of 7/9/2020 Never Reviewed          Codes Class Noted - Resolved POA    Hypothyroid ICD-10-CM: E03.9  ICD-9-CM: 244.9  7/9/2020 - Present Yes        HLD (hyperlipidemia) ICD-10-CM: E78.5  ICD-9-CM: 272.4  7/9/2020 - Present Yes        COVID-19 ICD-10-CM: U07.1  ICD-9-CM: 079.89  7/4/2020 - Present Yes        * (Principal) Acute respiratory failure with hypoxemia [facility]) ICD-10-CM: J96.01  ICD-9-CM: 518.81  7/4/2020 - Present Yes        Autism disorder ICD-10-CM: F84.0  ICD-9-CM: 299.00  7/4/2020 - Present Yes        Di[facility] mellitus type 2, [facility] ([facility] ICD-10-CM: E11.9  ICD-9-CM: 250.00  7/4/2020 - Present Yes              PLAN:    #Novel coronavirus infected pneumonia:  #Acute respiratory failure with hypoxia  -Spo2 91% on room air in ER  -Continue inpatient  -Dexamethasone oral  -Consented for convalescent plasma (7/6)  -Consult infectious disease for remdesivir (7/6 - 7/10)  -Zinc sulfate  -Ascorbic acid  -Daily labs     #Hypothyroidism- home meds  #HLD-home meds  #DM2-hold metformin, AM sugars normal, no need for finger sticks/sliding scale at this time     DC planning/Dispo:  Home when able.  Family likely sick as well  DVT ppx:  lovenox    Signed:  Jose C Gutiérrez MD

## 2020-07-09 NOTE — PROGRESS NOTES
Shift assessment:  Pt alert, oriented to person, place, situation, disoriented to time. On 2 L oxygen NC. Respirations even, unlabored. Lung sounds diminished. No distress noted. HR regular, bradycardic. Abdomen soft, non tender. Bowel sounds active. Denies pain or other needs. Call light within reach. Bed in low, locked position.

## 2020-07-09 NOTE — PROGRESS NOTES
Problem: Risk for Spread of Infection  Goal: Prevent transmission of infectious organism to others  Description: Prevent the transmission of infectious organisms to other patients, staff members, and visitors. Outcome: Progressing Towards Goal     Problem: Patient Education:  Go to Education Activity  Goal: Patient/Family Education  Outcome: Progressing Towards Goal     Problem: Falls - Risk of  Goal: *Absence of Falls  Description: Document Nancy Ma Fall Risk and appropriate interventions in the flowsheet.   Outcome: Progressing Towards Goal  Note: Fall Risk Interventions:            Medication Interventions: Bed/chair exit alarm, Patient to call before getting OOB, Teach patient to arise slowly                   Problem: Patient Education: Go to Patient Education Activity  Goal: Patient/Family Education  Outcome: Progressing Towards Goal     Problem: Breathing Pattern - Ineffective  Goal: *Absence of hypoxia  Outcome: Progressing Towards Goal  Goal: *Use of effective breathing techniques  Outcome: Progressing Towards Goal  Goal: *PALLIATIVE CARE:  Alleviation of Dyspnea  Outcome: Progressing Towards Goal     Problem: Patient Education: Go to Patient Education Activity  Goal: Patient/Family Education  Outcome: Progressing Towards Goal

## 2020-07-09 NOTE — PROGRESS NOTES
Shift assessment complete. Pt pleasant, alert and oriented x4. Respirations even and unlabored. Lung sounds diminished. Supplemental 02 provided at 2L via NC. HR frandy. Abdomen soft with active bowel sounds heard in all four quadrants. Skin intact, no edema noted. IV patent and capped. Pt denies pain, nausea, SOB. All needs met at this time. Safety measures in place, call light within reach, side rails x3. Will continue to monitor.

## 2020-07-09 NOTE — PROGRESS NOTES
Problem: Risk for Spread of Infection  Goal: Prevent transmission of infectious organism to others  Description: Prevent the transmission of infectious organisms to other patients, staff members, and visitors. Outcome: Progressing Towards Goal     Problem: Patient Education:  Go to Education Activity  Goal: Patient/Family Education  Outcome: Progressing Towards Goal     Problem: Falls - Risk of  Goal: *Absence of Falls  Description: Document Ban Gwinner Fall Risk and appropriate interventions in the flowsheet.   Outcome: Progressing Towards Goal  Note: Fall Risk Interventions:            Medication Interventions: Patient to call before getting OOB                   Problem: Patient Education: Go to Patient Education Activity  Goal: Patient/Family Education  Outcome: Progressing Towards Goal     Problem: Breathing Pattern - Ineffective  Goal: *Absence of hypoxia  Outcome: Progressing Towards Goal  Goal: *Use of effective breathing techniques  Outcome: Progressing Towards Goal  Goal: *PALLIATIVE CARE:  Alleviation of Dyspnea  Outcome: Progressing Towards Goal     Problem: Patient Education: Go to Patient Education Activity  Goal: Patient/Family Education  Outcome: Progressing Towards Goal

## 2020-07-09 NOTE — PROGRESS NOTES
Permission given by patient to speak to patient's sister, Kierra Marcos. Update given on the phone from patient's room.

## 2020-07-10 VITALS
BODY MASS INDEX: 36.65 KG/M2 | DIASTOLIC BLOOD PRESSURE: 55 MMHG | HEIGHT: 65 IN | OXYGEN SATURATION: 93 % | RESPIRATION RATE: 18 BRPM | SYSTOLIC BLOOD PRESSURE: 103 MMHG | TEMPERATURE: 97.5 F | HEART RATE: 52 BPM | WEIGHT: 220 LBS

## 2020-07-10 LAB
ALBUMIN SERPL-MCNC: 2.9 G/DL (ref 3.5–5)
ALBUMIN/GLOB SERPL: 0.7 {RATIO} (ref 1.2–3.5)
ALP SERPL-CCNC: 55 U/L (ref 50–136)
ALT SERPL-CCNC: 52 U/L (ref 12–65)
ANION GAP SERPL CALC-SCNC: 8 MMOL/L (ref 7–16)
AST SERPL-CCNC: 41 U/L (ref 15–37)
BASOPHILS # BLD: 0 K/UL (ref 0–0.2)
BASOPHILS NFR BLD: 0 % (ref 0–2)
BILIRUB SERPL-MCNC: 0.5 MG/DL (ref 0.2–1.1)
BUN SERPL-MCNC: 18 MG/DL (ref 6–23)
CALCIUM SERPL-MCNC: 8.2 MG/DL (ref 8.3–10.4)
CHLORIDE SERPL-SCNC: 104 MMOL/L (ref 98–107)
CO2 SERPL-SCNC: 31 MMOL/L (ref 21–32)
CREAT SERPL-MCNC: 0.79 MG/DL (ref 0.6–1)
DIFFERENTIAL METHOD BLD: ABNORMAL
EOSINOPHIL # BLD: 0 K/UL (ref 0–0.8)
EOSINOPHIL NFR BLD: 0 % (ref 0.5–7.8)
ERYTHROCYTE [DISTWIDTH] IN BLOOD BY AUTOMATED COUNT: 12.7 % (ref 11.9–14.6)
GLOBULIN SER CALC-MCNC: 4.4 G/DL (ref 2.3–3.5)
GLUCOSE BLD STRIP.AUTO-MCNC: 131 MG/DL (ref 65–100)
GLUCOSE BLD STRIP.AUTO-MCNC: 148 MG/DL (ref 65–100)
GLUCOSE SERPL-MCNC: 142 MG/DL (ref 65–100)
HCT VFR BLD AUTO: 44.7 % (ref 35.8–46.3)
HGB BLD-MCNC: 14.1 G/DL (ref 11.7–15.4)
IMM GRANULOCYTES # BLD AUTO: 0.2 K/UL (ref 0–0.5)
IMM GRANULOCYTES NFR BLD AUTO: 2 % (ref 0–5)
LYMPHOCYTES # BLD: 1.3 K/UL (ref 0.5–4.6)
LYMPHOCYTES NFR BLD: 12 % (ref 13–44)
MAGNESIUM SERPL-MCNC: 2.5 MG/DL (ref 1.8–2.4)
MCH RBC QN AUTO: 27.3 PG (ref 26.1–32.9)
MCHC RBC AUTO-ENTMCNC: 31.5 G/DL (ref 31.4–35)
MCV RBC AUTO: 86.5 FL (ref 79.6–97.8)
MONOCYTES # BLD: 0.5 K/UL (ref 0.1–1.3)
MONOCYTES NFR BLD: 4 % (ref 4–12)
NEUTS SEG # BLD: 9.2 K/UL (ref 1.7–8.2)
NEUTS SEG NFR BLD: 82 % (ref 43–78)
NRBC # BLD: 0 K/UL (ref 0–0.2)
PHOSPHATE SERPL-MCNC: 3.9 MG/DL (ref 2.5–4.5)
PLATELET # BLD AUTO: 379 K/UL (ref 150–450)
PMV BLD AUTO: 10.3 FL (ref 9.4–12.3)
POTASSIUM SERPL-SCNC: 3.9 MMOL/L (ref 3.5–5.1)
PROT SERPL-MCNC: 7.3 G/DL (ref 6.3–8.2)
RBC # BLD AUTO: 5.17 M/UL (ref 4.05–5.2)
SODIUM SERPL-SCNC: 143 MMOL/L (ref 136–145)
WBC # BLD AUTO: 11.2 K/UL (ref 4.3–11.1)

## 2020-07-10 PROCEDURE — 74011250636 HC RX REV CODE- 250/636: Performed by: FAMILY MEDICINE

## 2020-07-10 PROCEDURE — 82962 GLUCOSE BLOOD TEST: CPT

## 2020-07-10 PROCEDURE — 74011250637 HC RX REV CODE- 250/637: Performed by: FAMILY MEDICINE

## 2020-07-10 PROCEDURE — 80053 COMPREHEN METABOLIC PANEL: CPT

## 2020-07-10 PROCEDURE — 84100 ASSAY OF PHOSPHORUS: CPT

## 2020-07-10 PROCEDURE — 74011000250 HC RX REV CODE- 250: Performed by: FAMILY MEDICINE

## 2020-07-10 PROCEDURE — 74011000258 HC RX REV CODE- 258: Performed by: INTERNAL MEDICINE

## 2020-07-10 PROCEDURE — 74011000250 HC RX REV CODE- 250: Performed by: INTERNAL MEDICINE

## 2020-07-10 PROCEDURE — 94761 N-INVAS EAR/PLS OXIMETRY MLT: CPT

## 2020-07-10 PROCEDURE — 83735 ASSAY OF MAGNESIUM: CPT

## 2020-07-10 PROCEDURE — 74011000258 HC RX REV CODE- 258: Performed by: FAMILY MEDICINE

## 2020-07-10 PROCEDURE — 85025 COMPLETE CBC W/AUTO DIFF WBC: CPT

## 2020-07-10 PROCEDURE — 74011250637 HC RX REV CODE- 250/637: Performed by: INTERNAL MEDICINE

## 2020-07-10 RX ADMIN — REMDESIVIR 100 MG: 100 INJECTION, POWDER, LYOPHILIZED, FOR SOLUTION INTRAVENOUS at 15:03

## 2020-07-10 RX ADMIN — ASCORBIC ACID 1.5 G: 500 INJECTION, SOLUTION INTRAMUSCULAR; INTRAVENOUS; SUBCUTANEOUS at 06:26

## 2020-07-10 RX ADMIN — Medication 10 ML: at 13:21

## 2020-07-10 RX ADMIN — ASCORBIC ACID 1.5 G: 500 INJECTION, SOLUTION INTRAMUSCULAR; INTRAVENOUS; SUBCUTANEOUS at 12:27

## 2020-07-10 RX ADMIN — Medication 220 MG: at 08:15

## 2020-07-10 RX ADMIN — LEVOTHYROXINE SODIUM 50 MCG: 0.05 TABLET ORAL at 08:15

## 2020-07-10 RX ADMIN — Medication 10 ML: at 06:26

## 2020-07-10 RX ADMIN — PANTOPRAZOLE SODIUM 40 MG: 40 TABLET, DELAYED RELEASE ORAL at 08:15

## 2020-07-10 RX ADMIN — DEXAMETHASONE 6 MG: 4 TABLET ORAL at 08:15

## 2020-07-10 RX ADMIN — ASCORBIC ACID 1.5 G: 500 INJECTION, SOLUTION INTRAMUSCULAR; INTRAVENOUS; SUBCUTANEOUS at 01:21

## 2020-07-10 NOTE — PROGRESS NOTES
Problem: Risk for Spread of Infection  Goal: Prevent transmission of infectious organism to others  Description: Prevent the transmission of infectious organisms to other patients, staff members, and visitors. Outcome: Resolved/Met     Problem: Patient Education:  Go to Education Activity  Goal: Patient/Family Education  Outcome: Resolved/Met     Problem: Falls - Risk of  Goal: *Absence of Falls  Description: Document Lauryn Fall Risk and appropriate interventions in the flowsheet.   Outcome: Resolved/Met     Problem: Patient Education: Go to Patient Education Activity  Goal: Patient/Family Education  Outcome: Resolved/Met     Problem: Breathing Pattern - Ineffective  Goal: *Absence of hypoxia  Outcome: Resolved/Met  Goal: *Use of effective breathing techniques  Outcome: Resolved/Met  Goal: *PALLIATIVE CARE:  Alleviation of Dyspnea  Outcome: Resolved/Met     Problem: Patient Education: Go to Patient Education Activity  Goal: Patient/Family Education  Outcome: Resolved/Met

## 2020-07-10 NOTE — PROGRESS NOTES
Shift assessment complete. Pt pleasant, alert and oriented x4. Respirations even and unlabored. Lung sounds diminished. Supplemental 02 provided at 2L via NC. HR frandy. Abdomen soft with active bowel sounds heard in all four quadrants. Pt with poor appetite and decreased PO intake. Skin intact, no edema noted. IV patent and capped. Pt denies pain, nausea, SOB. All needs met at this time. Safety measures in place, call light within reach, side rails x3. Lights dimmed for comfort per pt request. Will continue to monitor.

## 2020-07-10 NOTE — PROGRESS NOTES
Care Management Interventions  PCP Verified by CM: Yes  Transition of Care Consult (CM Consult): [address]: No  Reason Outside [place]: Unable to staff case  Physical Therapy Consult: Yes  Occupational Therapy Consult: No  Speech Therapy Consult: No  Current Support Network: Relative's Home  Confirm Follow Up Transport: Family  The Plan for Transition of Care is Related to the Following Treatment Goals :  RN/PT  The Patient and/or Patient Representative was Provided with a Choice of Provider and Agrees with the Discharge Plan?: Yes  Name of the Patient Representative Who was Provided with a Choice of Provider and Agrees with the Discharge Plan: B White-mother  Freedom of Choice List was Provided with Basic Dialogue that Supports the Patient's Individualized Plan of Care/Goals, Treatment Preferences and Shares the Quality Data Associated with the Providers?: Yes  [place] Resource Information Provided?: No  Discharge Location  Discharge Placement: Home with home health   CM faxed order for [facility] to [facility] RN/PT. Patient was delivered a [address] tank.

## 2020-07-10 NOTE — PROGRESS NOTES
Discharge instructions, follow up appointment, and home oxygen reviewed with pt's sister, Kole Gomez. Opportunity for questions provided, verbalized understanding. Will continue to monitor.

## 2020-07-10 NOTE — DISCHARGE SUMMARY
Hospitalist Discharge Summary     Admit Date:  2020  6:40 PM   Name:  Niko Mcgrath   Age:  54 y.o.  :  1965   MRN:  [id number]   PCP:  Lucero Ray MD  Treatment Team: Attending Provider: Jose Raul Granda MD; Utilization Review: Tracy Urias RN; Utilization Review: Gardenia Delong; Care Manager: Niko Dewitt; Consulting Provider: Jose Raul Granda MD; Utilization Review: Celestia Metro    Problem List for this Hospitalization:  Hospital Problems as of 7/10/2020 Never Reviewed          Codes Class Noted - Resolved POA    Hypothyroid ICD-10-CM: E03.9  ICD-9-CM: 244.9  2020 - Present Yes        HLD (hyperlipidemia) ICD-10-CM: E78.5  ICD-9-CM: 272.4  2020 - Present Yes        COVID-19 ICD-10-CM: U07.1  ICD-9-CM: 079.89  2020 - Present Yes        * (Principal) Acute respiratory failure with hypoxemia ([facility].) ICD-10-CM: J96.01  ICD-9-CM: 518.81  2020 - Present Yes        Autism disorder ICD-10-CM: F84.0  ICD-9-CM: 299.00  2020 - Present Yes        Diabetes mellitus type 2, controlled ([facility] ICD-10-CM: E11.9  ICD-9-CM: 250.00  2020 - Present Yes                Admission HPI from 2020:    \"54year-old female presenting to [facility] emergency department with a complaint of fevers and cough over the last several days.  Patient has had multiple family contacts tested positive for COVID-19.  Patient found to be hypoxic in the emergency department and admitted for treatment of suspected novel coronavirus infected pneumonia.  Rapid COVID test positive.  Start dexamethasone, consent for convalescent plasma, consult infectious disease for remdesivir.     VANTAGE POINT OF [facility] Course:  She was admitted to the [facility] unit. She was given convalescent plasma and remdesivir and dexamethasone. She tolerate the treatments well.  She required oxygen while here and she will require oxygen at home.          Room air: SpO2 with O2 and liter flow   Resting SpO2   88%  93% on 4 L   Ambulating SpO2   84%  92% on 6 L      She is to socially distance while at home for 2 weeks. She is to wear a mask in her house for 2 weeks. She is to wear a mask outside at all times. She is social distance while in public at all timse. Follow up instructions below. Plan was discussed with patient. All questions answered. Patient was stable at time of discharge and was instructed to call or return if there are any concerns or recurrence of symptoms. Diagnostic Imaging/Tests:   Xr Chest Port    Result Date: 7/4/2020  AP chest radiograph History: fever and cough, suspect COVID-19, 55 years Female Comparison: None available Findings:   Normal cardiomediastinal silhouette. There are patchy bilateral mid to lower lung airspace opacities, consistent with patient's suspected acute atypical pneumonia. No evidence of pneumothorax, pleural effusion. Visualized soft tissue and osseous structures otherwise unremarkable. Impression:  Findings consistent with patient's suspected acute atypical pneumonia. Echocardiogram results:  No results found for this visit on 07/04/20.       All Micro Results     None          Labs: Results:       BMP, Mg, Phos Recent Labs     07/10/20  0514 07/09/20  0441 07/08/20  0435    146* 145   K 3.9 3.5 3.9    108* 108*   CO2 31 31 31   AGAP 8 7 6*   BUN 18 17 16   CREA 0.79 0.83 0.78   CA 8.2* 8.6 8.6   * 104* 125*   MG 2.5* 2.5* 2.4   PHOS 3.9 3.4 4.2      CBC Recent Labs     07/10/20  0514 07/09/20  0441 07/08/20  0435   WBC 11.2* 11.5* 10.8   RBC 5.17 4.67 4.83   HGB 14.1 13.2 13.6   HCT 44.7 40.3 42.0    378 308   GRANS 82* 75 82*   LYMPH 12* 16 12*   EOS 0* 0* 0*   MONOS 4 8 6   BASOS 0 0 0   IG 2 2 1   ANEU 9.2* 8.6* 8.8*   ABL 1.3 1.8 1.3   ALYSE 0.0 0.0 0.0   ABM 0.5 0.9 0.6   ABB 0.0 0.0 0.0   AIG 0.2 0.2 0.1      LFT Recent Labs     07/10/20  0514 07/09/20  0441 07/08/20  0435   ALT 52 40 33   AP 55 55 55   TP 7.3 7.1 7.3   ALB 2.9* 3. 0* 2.8*   GLOB 4.4* 4.1* 4.5*   AGRAT 0.7* 0.7* 0.6*      Cardiac Testing No results found for: BNPP, BNP, CPK, RCK1, RCK2, RCK3, RCK4, CKMB, CKNDX, CKND1, TROPT, TROIQ   Coagulation Tests No results found for: PTP, INR, APTT, INREXT   A1c No results found for: HBA1C, HGBE8, OVE3THEZ   Lipid Panel No results found for: CHOL, CHOLPOCT, CHOLX, CHLST, CHOLV, 798736, HDL, HDLP, LDL, LDLC, DLDLP, 966723, VLDLC, VLDL, TGLX, TRIGL, TRIGP, TGLPOCT, CHHD, CHHDX   Thyroid Panel No results found for: T4, T3U, TSH, TSHEXT     Most Recent UA No results found for: COLOR, APPRN, REFSG, EILEEN, PROTU, GLUCU, KETU, BILU, BLDU, UROU, TRISTON, LEUKU     No Known Allergies    There is no immunization history on file for this patient. All Labs from Last 24 Hrs:  Recent Results (from the past 24 hour(s))   GLUCOSE, POC    Collection Time: 07/09/20  4:31 PM   Result Value Ref Range    Glucose (POC) 112 (H) 65 - 100 mg/dL   GLUCOSE, POC    Collection Time: 07/09/20  8:41 PM   Result Value Ref Range    Glucose (POC) 110 (H) 65 - 100 mg/dL   PHOSPHORUS    Collection Time: 07/10/20  5:14 AM   Result Value Ref Range    Phosphorus 3.9 2.5 - 4.5 MG/DL   MAGNESIUM    Collection Time: 07/10/20  5:14 AM   Result Value Ref Range    Magnesium 2.5 (H) 1.8 - 2.4 mg/dL   CBC WITH AUTOMATED DIFF    Collection Time: 07/10/20  5:14 AM   Result Value Ref Range    WBC 11.2 (H) 4.3 - 11.1 K/uL    RBC 5.17 4.05 - 5.2 M/uL    HGB 14.1 11.7 - 15.4 g/dL    HCT 44.7 35.8 - 46.3 %    MCV 86.5 79.6 - 97.8 FL    MCH 27.3 26.1 - 32.9 PG    MCHC 31.5 31.4 - 35.0 g/dL    RDW 12.7 11.9 - 14.6 %    PLATELET 654 979 - 419 K/uL    MPV 10.3 9.4 - 12.3 FL    ABSOLUTE NRBC 0.00 0.0 - 0.2 K/uL    DF AUTOMATED      NEUTROPHILS 82 (H) 43 - 78 %    LYMPHOCYTES 12 (L) 13 - 44 %    MONOCYTES 4 4.0 - 12.0 %    EOSINOPHILS 0 (L) 0.5 - 7.8 %    BASOPHILS 0 0.0 - 2.0 %    IMMATURE GRANULOCYTES 2 0.0 - 5.0 %    ABS. NEUTROPHILS 9.2 (H) 1.7 - 8.2 K/UL    ABS.  LYMPHOCYTES 1.3 0.5 - 4.6 K/UL ABS. MONOCYTES 0.5 0.1 - 1.3 K/UL    ABS. EOSINOPHILS 0.0 0.0 - 0.8 K/UL    ABS. BASOPHILS 0.0 0.0 - 0.2 K/UL    ABS. IMM. GRANS. 0.2 0.0 - 0.5 K/UL   METABOLIC PANEL, COMPREHENSIVE    Collection Time: 07/10/20  5:14 AM   Result Value Ref Range    Sodium 143 136 - 145 mmol/L    Potassium 3.9 3.5 - 5.1 mmol/L    Chloride 104 98 - 107 mmol/L    CO2 31 21 - 32 mmol/L    Anion gap 8 7 - 16 mmol/L    Glucose 142 (H) 65 - 100 mg/dL    BUN 18 6 - 23 MG/DL    Creatinine 0.79 0.6 - 1.0 MG/DL    GFR est AA >60 >60 ml/min/1.73m2    GFR est non-AA >60 >60 ml/min/1.73m2    Calcium 8.2 (L) 8.3 - 10.4 MG/DL    Bilirubin, total 0.5 0.2 - 1.1 MG/DL    ALT (SGPT) 52 12 - 65 U/L    AST (SGOT) 41 (H) 15 - 37 U/L    Alk. phosphatase 55 50 - 136 U/L    Protein, total 7.3 6.3 - 8.2 g/dL    Albumin 2.9 (L) 3.5 - 5.0 g/dL    Globulin 4.4 (H) 2.3 - 3.5 g/dL    A-G Ratio 0.7 (L) 1.2 - 3.5     GLUCOSE, POC    Collection Time: 07/10/20  8:05 AM   Result Value Ref Range    Glucose (POC) 148 (H) 65 - 100 mg/dL   GLUCOSE, POC    Collection Time: 07/10/20 11:25 AM   Result Value Ref Range    Glucose (POC) 131 (H) 65 - 100 mg/dL       Discharge Exam:  Patient Vitals for the past 24 hrs:   Temp Pulse Resp BP SpO2   07/10/20 1225     93 %   07/10/20 1122 97.5 °F (36.4 °C) (!) 52 18 103/55 93 %   07/10/20 0800 97.5 °F (36.4 °C) (!) 50 14 110/57 93 %   07/10/20 0352 97.7 °F (36.5 °C) (!) 56 18 124/65 96 %   07/10/20 0125 98 °F (36.7 °C) (!) 52 18 125/54 95 %   07/09/20 1954 98 °F (36.7 °C) (!) 46 18 111/55 94 %   07/09/20 1522 98.2 °F (36.8 °C) (!) 46 16 112/58 99 %     Oxygen Therapy  O2 Sat (%): 93 % (07/10/20 1225)  Pulse via Oximetry: 67 beats per minute (07/10/20 1225)  O2 Device: Nasal cannula (07/10/20 1225)  O2 Flow Rate (L/min): 4 l/min (07/10/20 1225)  No intake or output data in the 24 hours ending 07/10/20 1233    General:    Well nourished. Alert. No distress. On oxygen   Eyes:   Normal sclera.   Extraocular movements intact. ENT:  Normocephalic, atraumatic. Moist mucous membranes  CV:   Regular rate and rhythm. No murmur, rub, or gallop. Lungs:  Relatively clear  Abdomen: Soft, nontender, nondistended. Bowel sounds normal.   Extremities: Warm and dry. No cyanosis or edema. Neurologic: Sensation intact. Skin:     No rashes or jaundice. Psych:  Normal mood and affect. Discharge Info:   Current Discharge Medication List      CONTINUE these medications which have NOT CHANGED    Details   metFORMIN (GLUMETZA ER) 500 mg TG24 24 hour tablet Take 500 mg by mouth daily (after breakfast). Indications: type 2 diabetes mellitus      levothyroxine (SYNTHROID) 50 mcg tablet Take 50 mcg by mouth Daily (before breakfast). Indications: a condition with low thyroid hormone levels      pravastatin (PRAVACHOL) 20 mg tablet Take 20 mg by mouth nightly. Indications: high cholesterol               Disposition: home    Activity: Activity as tolerated  Diet: DIET DIABETIC CONSISTENT CARB Regular    Follow-up Appointments   Procedures    FOLLOW UP VISIT Appointment in: Two Weeks Follow up with your primary care doctor in 2 weeks for hospital follow up for COVID positive. Sent home with home oxygen. Recommend checking oxygen status to see if you need to continue. Follow up with your primary care doctor in 2 weeks for hospital follow up for COVID positive. Sent home with home oxygen. Recommend checking oxygen status to see if you need to continue. Standing Status:   Standing     Number of Occurrences:   1     Order Specific Question:   Appointment in     Answer: Two Weeks         Follow-up Information     Follow up With Specialties Details Why Contact Info    Pola Guzman MD Family Practice In 2 weeks Hospital follow up for COVID positive. Sent home with oxygen. Recommend testing her saturations to see if she still needs it.  [address] [place] [address] [phone]            Time spent in patient discharge planning and coordination 35 minutes.     Signed:  Jalen Doe MD

## 2020-07-10 NOTE — PROGRESS NOTES
Hospitalist Progress Note     Admit Date:  2020  6:40 PM   Name:  Alfa Carrizales   Age:  54 y.o.  :  1965   MRN:  [id number]   PCP:  Mary Alcazar MD  Treatment Team: Attending Provider: Alex Mancilla MD; Utilization Review: Black Dc, PATRICK; Utilization Review: Seema Martínez; Care Manager: Young Joseph.; Consulting Provider: Alex Mancilla MD; Utilization Review: Laisha Kovacs    Subjective:   54-year-old female presenting to [address] [place] [address] emergency department with a complaint of fevers and cough over the last several days. Javed Wade has had multiple family contacts tested positive for COVID-19.  Patient found to be hypoxic in the emergency department and admitted for treatment of suspected novel coronavirus infected pneumonia.  Rapid COVID test positive.  Start dexamethasone, consent for convalescent plasma, consult infectious disease for remdesivir.     7/10 patient seen and examined at bedside. Doing well     2L NC oxygen running but not in nose. On bed. Doing well. No complaints.   convalescent plasma ()  remdesivir ( - 7/10)   Oxygen qualifier today. Hopefully home today    Nursing notes and chart reviewed. Review of Systems negative with exception of pertinent positives noted above. Objective:     Patient Vitals for the past 24 hrs:   Temp Pulse Resp BP SpO2   07/10/20 0800 97.5 °F (36.4 °C) (!) 50 14 110/57 93 %   07/10/20 0352 97.7 °F (36.5 °C) (!) 56 18 124/65 96 %   07/10/20 0125 98 °F (36.7 °C) (!) 52 18 125/54 95 %   20 1954 98 °F (36.7 °C) (!) 46 18 111/55 94 %   20 1522 98.2 °F (36.8 °C) (!) 46 16 112/58 99 %   20 1158 97.7 °F (36.5 °C) (!) 50 16 132/73 97 %     Oxygen Therapy  O2 Sat (%): 93 % (07/10/20 0800)  O2 Device: Nasal cannula (20 0900)  O2 Flow Rate (L/min): 3 l/min (20 0900)  No intake or output data in the 24 hours ending 07/10/20 1132      General:    Well nourished. Alert.   2L NC oxygen but it is laying on the bed  CV:   RRR. No murmur, rub, or gallop. Lungs:   CTAB. No wheezing, rhonchi, or rales. Abdomen:   Soft, nontender, nondistended. Extremities: Warm and dry. No cyanosis or edema. Skin:     No rashes or jaundice. Data Review:  I have reviewed all labs, meds, telemetry events, and studies from the last 24 hours. Recent Results (from the past 24 hour(s))   GLUCOSE, POC    Collection Time: 07/09/20 11:42 AM   Result Value Ref Range    Glucose (POC) 109 (H) 65 - 100 mg/dL   GLUCOSE, POC    Collection Time: 07/09/20  4:31 PM   Result Value Ref Range    Glucose (POC) 112 (H) 65 - 100 mg/dL   GLUCOSE, POC    Collection Time: 07/09/20  8:41 PM   Result Value Ref Range    Glucose (POC) 110 (H) 65 - 100 mg/dL   PHOSPHORUS    Collection Time: 07/10/20  5:14 AM   Result Value Ref Range    Phosphorus 3.9 2.5 - 4.5 MG/DL   MAGNESIUM    Collection Time: 07/10/20  5:14 AM   Result Value Ref Range    Magnesium 2.5 (H) 1.8 - 2.4 mg/dL   CBC WITH AUTOMATED DIFF    Collection Time: 07/10/20  5:14 AM   Result Value Ref Range    WBC 11.2 (H) 4.3 - 11.1 K/uL    RBC 5.17 4.05 - 5.2 M/uL    HGB 14.1 11.7 - 15.4 g/dL    HCT 44.7 35.8 - 46.3 %    MCV 86.5 79.6 - 97.8 FL    MCH 27.3 26.1 - 32.9 PG    MCHC 31.5 31.4 - 35.0 g/dL    RDW 12.7 11.9 - 14.6 %    PLATELET 904 958 - 733 K/uL    MPV 10.3 9.4 - 12.3 FL    ABSOLUTE NRBC 0.00 0.0 - 0.2 K/uL    DF AUTOMATED      NEUTROPHILS 82 (H) 43 - 78 %    LYMPHOCYTES 12 (L) 13 - 44 %    MONOCYTES 4 4.0 - 12.0 %    EOSINOPHILS 0 (L) 0.5 - 7.8 %    BASOPHILS 0 0.0 - 2.0 %    IMMATURE GRANULOCYTES 2 0.0 - 5.0 %    ABS. NEUTROPHILS 9.2 (H) 1.7 - 8.2 K/UL    ABS. LYMPHOCYTES 1.3 0.5 - 4.6 K/UL    ABS. MONOCYTES 0.5 0.1 - 1.3 K/UL    ABS. EOSINOPHILS 0.0 0.0 - 0.8 K/UL    ABS. BASOPHILS 0.0 0.0 - 0.2 K/UL    ABS. IMM.  GRANS. 0.2 0.0 - 0.5 K/UL   METABOLIC PANEL, COMPREHENSIVE    Collection Time: 07/10/20  5:14 AM   Result Value Ref Range    Sodium 143 136 - 145 mmol/L Potassium 3.9 3.5 - 5.1 mmol/L    Chloride 104 98 - 107 mmol/L    CO2 31 21 - 32 mmol/L    Anion gap 8 7 - 16 mmol/L    Glucose 142 (H) 65 - 100 mg/dL    BUN 18 6 - 23 MG/DL    Creatinine 0.79 0.6 - 1.0 MG/DL    GFR est AA >60 >60 ml/min/1.73m2    GFR est non-AA >60 >60 ml/min/1.73m2    Calcium 8.2 (L) 8.3 - 10.4 MG/DL    Bilirubin, total 0.5 0.2 - 1.1 MG/DL    ALT (SGPT) 52 12 - 65 U/L    AST (SGOT) 41 (H) 15 - 37 U/L    Alk. phosphatase 55 50 - 136 U/L    Protein, total 7.3 6.3 - 8.2 g/dL    Albumin 2.9 (L) 3.5 - 5.0 g/dL    Globulin 4.4 (H) 2.3 - 3.5 g/dL    A-G Ratio 0.7 (L) 1.2 - 3.5     GLUCOSE, POC    Collection Time: 07/10/20  8:05 AM   Result Value Ref Range    Glucose (POC) 148 (H) 65 - 100 mg/dL        All Micro Results     None          Current Meds:  Current Facility-Administered Medications   Medication Dose Route Frequency    levothyroxine (SYNTHROID) tablet 50 mcg  50 mcg Oral ACB    pravastatin (PRAVACHOL) tablet 20 mg  20 mg Oral QHS    pantoprazole (PROTONIX) tablet 40 mg  40 mg Oral ACB    remdesivir 100 mg in 0.9% sodium chloride 250 mL IVPB  100 mg IntraVENous Q24H    ascorbic acid (vitamin C) 1.5 g in dextrose 5% 50 mL IVPB  1.5 g IntraVENous Q6H    zinc sulfate tablet 220 mg  220 mg Oral DAILY    dexAMETHasone (DECADRON) tablet 6 mg  6 mg Oral DAILY    0.9% sodium chloride infusion 250 mL  250 mL IntraVENous PRN    sodium chloride (NS) flush 5-40 mL  5-40 mL IntraVENous Q8H    sodium chloride (NS) flush 5-40 mL  5-40 mL IntraVENous PRN    acetaminophen (TYLENOL) tablet 650 mg  650 mg Oral Q4H PRN    ondansetron (ZOFRAN) injection 4 mg  4 mg IntraVENous Q4H PRN    magnesium hydroxide (MILK OF MAGNESIA) 400 mg/5 mL oral suspension 30 mL  30 mL Oral DAILY PRN    enoxaparin (LOVENOX) injection 40 mg  40 mg SubCUTAneous Q24H    insulin regular (NOVOLIN R, HUMULIN R) injection   SubCUTAneous AC&HS       Other Studies (last 24 hours):  No results found.     Assessment and Plan: Hospital Problems as of 7/10/2020 Never Reviewed          Codes Class Noted - Resolved POA    Hypothyroid ICD-10-CM: E03.9  ICD-9-CM: 244.9  7/9/2020 - Present Yes        HLD (hyperlipidemia) ICD-10-CM: E78.5  ICD-9-CM: 272.4  7/9/2020 - Present Yes        COVID-19 ICD-10-CM: U07.1  ICD-9-CM: 079.89  7/4/2020 - Present Yes        * (Principal) Acute respiratory failure with hypoxemia [facility]) ICD-10-CM: J96.01  ICD-9-CM: 518.81  7/4/2020 - Present Yes        Autism disorder ICD-10-CM: F84.0  ICD-9-CM: 299.00  7/4/2020 - Present Yes        Diabetes mellitus type 2, controlled ([facility] ICD-10-CM: E11.9  ICD-9-CM: 250.00  7/4/2020 - Present Yes              PLAN:    #Novel coronavirus infected pneumonia:  #Acute respiratory failure with hypoxia  -Spo2 91% on room air in ER  -Continue inpatient  -Dexamethasone oral  -Consented for convalescent plasma (7/6)  -Consult infectious disease for remdesivir (7/6 - 7/10)  -Zinc sulfate  -Ascorbic acid  -Daily labs  -oxygen qualifier today and hopefully home today     #Hypothyroidism- home meds  #HLD-home meds  #DM2-hold metformin, AM sugars normal, no need for finger sticks/sliding scale at this time     DC planning/Dispo:  Home today hopefully. Mother hospitalized on this floor.  Father at home  DVT ppx:  lovenox    Signed:  Myles Marie MD

## 2020-07-10 NOTE — PROGRESS NOTES
Oxygen Qualifier       Room air: SpO2 with O2 and liter flow   Resting SpO2   88%  93% on 4 L   Ambulating SpO2   84%  92% on 6 L       Completed by:    Salvador Ferrara, RT

## 2020-07-11 ENCOUNTER — PATIENT OUTREACH (OUTPATIENT)
Dept: CASE MANAGEMENT | Age: 55
End: 2020-07-11

## 2020-07-11 ENCOUNTER — HOSPITAL ENCOUNTER (EMERGENCY)
Age: 55
Discharge: HOME | End: 2020-07-11
Attending: EMERGENCY MEDICINE
Payer: COMMERCIAL

## 2020-07-11 VITALS
OXYGEN SATURATION: 100 % | RESPIRATION RATE: 16 BRPM | HEART RATE: 50 BPM | TEMPERATURE: 98.1 F | DIASTOLIC BLOOD PRESSURE: 63 MMHG | SYSTOLIC BLOOD PRESSURE: 137 MMHG

## 2020-07-11 DIAGNOSIS — R63.0 POOR APPETITE: Primary | ICD-10-CM

## 2020-07-11 DIAGNOSIS — R53.83 FATIGUE, UNSPECIFIED TYPE: ICD-10-CM

## 2020-07-11 LAB
ALBUMIN SERPL-MCNC: 2.7 G/DL (ref 3.5–5)
ALBUMIN/GLOB SERPL: 0.7 {RATIO} (ref 1.2–3.5)
ALP SERPL-CCNC: 51 U/L (ref 50–136)
ALT SERPL-CCNC: 39 U/L (ref 12–65)
ANION GAP SERPL CALC-SCNC: 8 MMOL/L (ref 7–16)
APPEARANCE UR: CLEAR
AST SERPL-CCNC: 25 U/L (ref 15–37)
BACTERIA URNS QL MICRO: 0 /HPF
BASOPHILS # BLD: 0 K/UL (ref 0–0.2)
BASOPHILS NFR BLD: 0 % (ref 0–2)
BILIRUB SERPL-MCNC: 0.5 MG/DL (ref 0.2–1.1)
BILIRUB UR QL: NEGATIVE
BUN SERPL-MCNC: 21 MG/DL (ref 6–23)
CALCIUM SERPL-MCNC: 7.8 MG/DL (ref 8.3–10.4)
CASTS URNS QL MICRO: ABNORMAL /LPF
CHLORIDE SERPL-SCNC: 110 MMOL/L (ref 98–107)
CO2 SERPL-SCNC: 29 MMOL/L (ref 21–32)
COLOR UR: YELLOW
CREAT SERPL-MCNC: 0.84 MG/DL (ref 0.6–1)
DIFFERENTIAL METHOD BLD: ABNORMAL
EOSINOPHIL # BLD: 0.1 K/UL (ref 0–0.8)
EOSINOPHIL NFR BLD: 1 % (ref 0.5–7.8)
EPI CELLS #/AREA URNS HPF: ABNORMAL /HPF
ERYTHROCYTE [DISTWIDTH] IN BLOOD BY AUTOMATED COUNT: 12.9 % (ref 11.9–14.6)
GLOBULIN SER CALC-MCNC: 3.8 G/DL (ref 2.3–3.5)
GLUCOSE SERPL-MCNC: 81 MG/DL (ref 65–100)
GLUCOSE UR STRIP.AUTO-MCNC: NEGATIVE MG/DL
HCT VFR BLD AUTO: 42.3 % (ref 35.8–46.3)
HGB BLD-MCNC: 14 G/DL (ref 11.7–15.4)
HGB UR QL STRIP: NEGATIVE
IMM GRANULOCYTES # BLD AUTO: 0.3 K/UL (ref 0–0.5)
IMM GRANULOCYTES NFR BLD AUTO: 2 % (ref 0–5)
KETONES UR QL STRIP.AUTO: ABNORMAL MG/DL
LEUKOCYTE ESTERASE UR QL STRIP.AUTO: ABNORMAL
LIPASE SERPL-CCNC: 131 U/L (ref 73–393)
LYMPHOCYTES # BLD: 2.9 K/UL (ref 0.5–4.6)
LYMPHOCYTES NFR BLD: 24 % (ref 13–44)
MCH RBC QN AUTO: 28.8 PG (ref 26.1–32.9)
MCHC RBC AUTO-ENTMCNC: 33.1 G/DL (ref 31.4–35)
MCV RBC AUTO: 87 FL (ref 79.6–97.8)
MONOCYTES # BLD: 0.9 K/UL (ref 0.1–1.3)
MONOCYTES NFR BLD: 7 % (ref 4–12)
NEUTS SEG # BLD: 7.7 K/UL (ref 1.7–8.2)
NEUTS SEG NFR BLD: 65 % (ref 43–78)
NITRITE UR QL STRIP.AUTO: NEGATIVE
NRBC # BLD: 0 K/UL (ref 0–0.2)
PH UR STRIP: 6.5 [PH] (ref 5–9)
PLATELET # BLD AUTO: 346 K/UL (ref 150–450)
PMV BLD AUTO: 10.2 FL (ref 9.4–12.3)
POTASSIUM SERPL-SCNC: 3.3 MMOL/L (ref 3.5–5.1)
PROT SERPL-MCNC: 6.5 G/DL (ref 6.3–8.2)
PROT UR STRIP-MCNC: NEGATIVE MG/DL
RBC # BLD AUTO: 4.86 M/UL (ref 4.05–5.2)
RBC #/AREA URNS HPF: ABNORMAL /HPF
SODIUM SERPL-SCNC: 147 MMOL/L (ref 136–145)
SP GR UR REFRACTOMETRY: 1.02 (ref 1–1.02)
UROBILINOGEN UR QL STRIP.AUTO: 0.2 EU/DL (ref 0.2–1)
WBC # BLD AUTO: 11.8 K/UL (ref 4.3–11.1)
WBC URNS QL MICRO: ABNORMAL /HPF

## 2020-07-11 PROCEDURE — 74011250636 HC RX REV CODE- 250/636: Performed by: EMERGENCY MEDICINE

## 2020-07-11 PROCEDURE — 80053 COMPREHEN METABOLIC PANEL: CPT

## 2020-07-11 PROCEDURE — 99284 EMERGENCY DEPT VISIT MOD MDM: CPT

## 2020-07-11 PROCEDURE — 85025 COMPLETE CBC W/AUTO DIFF WBC: CPT

## 2020-07-11 PROCEDURE — 83690 ASSAY OF LIPASE: CPT

## 2020-07-11 PROCEDURE — 81001 URINALYSIS AUTO W/SCOPE: CPT

## 2020-07-11 PROCEDURE — 96374 THER/PROPH/DIAG INJ IV PUSH: CPT

## 2020-07-11 RX ORDER — ONDANSETRON 2 MG/ML
4 INJECTION INTRAMUSCULAR; INTRAVENOUS ONCE
Status: COMPLETED | OUTPATIENT
Start: 2020-07-11 | End: 2020-07-11

## 2020-07-11 RX ORDER — ONDANSETRON 4 MG/1
4 TABLET, ORALLY DISINTEGRATING ORAL
Qty: 12 TAB | Refills: 0 | Status: SHIPPED | OUTPATIENT
Start: 2020-07-11

## 2020-07-11 RX ADMIN — ONDANSETRON 4 MG: 2 INJECTION INTRAMUSCULAR; INTRAVENOUS at 14:59

## 2020-07-11 RX ADMIN — SODIUM CHLORIDE 1000 ML: 9 INJECTION, SOLUTION INTRAVENOUS at 14:59

## 2020-07-11 NOTE — ED PROVIDER NOTES
72-year-old female with autism presenting for evaluation of weakness, somnolence, and poor p.o. intake since being discharged from the hospital yesterday. Patient has coronavirus positive. Patient tells me she is just been more sleepy. She is had no appetite. She denies any other complaints at this time such as shortness of breath, chest pain, headache, nausea, vomiting or diarrhea. EMS were called and upon arrival they found the patient was somewhat hypotensive with systolics in the 96P and she was given a liter of fluids with good response. On arrival the patient seems awake and alert in no acute distress. Apparently the entire family is coronavirus positive and the grandmother is apparently hospitalized right now not doing well.            Past Medical History:   Diagnosis Date    Diabetes ([facility] [address]     Ill-defined condition        Past Surgical History:   Procedure Laterality Date    HX ORTHOPAEDIC           Family History:   Problem Relation Age of Onset    Breast Cancer Neg Hx        Social History     Socioeconomic History    Marital status: SINGLE     Spouse name: Not on file    Number of children: Not on file    Years of education: Not on file    Highest education level: Not on file   Occupational History    Not on file   Social Needs    Financial resource strain: Not on file    Food insecurity     Worry: Not on file     Inability: Not on file    Transportation needs     Medical: Not on file     Non-medical: Not on file   Tobacco Use    Smoking status: Not on file   Substance and Sexual Activity    Alcohol use: Not on file    Drug use: Not on file    Sexual activity: Not on file   Lifestyle    Physical activity     Days per week: Not on file     Minutes per session: Not on file    Stress: Not on file   Relationships    Social connections     Talks on phone: Not on file     Gets together: Not on file     Attends Hindu service: Not on file     Active member of club or organization: Not on file     Attends meetings of clubs or organizations: Not on file     Relationship status: Not on file    Intimate partner violence     Fear of current or ex partner: Not on file     Emotionally abused: Not on file     Physically abused: Not on file     Forced sexual activity: Not on file   Other Topics Concern    Not on file   Social History Narrative    Not on file         ALLERGIES: Patient has no known allergies. Review of Systems   Constitutional: Positive for appetite change and fatigue. All other systems reviewed and are negative. Vitals:    07/11/20 1516 07/11/20 1617 07/11/20 1701 07/11/20 1709   BP:  124/60 137/63    Pulse: (!) 54 (!) 53 (!) 47 (!) 50   Resp:       Temp:       SpO2: 100% 100% 100%             Physical Exam  Vitals signs and nursing note reviewed. Constitutional:       Appearance: She is well-developed. HENT:      Head: Normocephalic and atraumatic. Eyes:      Conjunctiva/sclera: Conjunctivae normal.      Pupils: Pupils are equal, round, and reactive to light. Neck:      Musculoskeletal: Neck supple. Cardiovascular:      Rate and Rhythm: Normal rate and regular rhythm. Pulmonary:      Effort: Pulmonary effort is normal.      Breath sounds: Normal breath sounds. Abdominal:      General: Bowel sounds are normal.      Palpations: Abdomen is soft. Musculoskeletal: Normal range of motion. Skin:     General: Skin is warm and dry. Neurological:      Mental Status: She is alert and oriented to person, place, and time. Psychiatric:         Mood and Affect: Affect is blunt. MDM  Number of Diagnoses or Management Options  Fatigue, unspecified type:   Poor appetite:   Diagnosis management comments: 40-year-old female with a history of autism presenting for weakness and poor appetite. She has known coronavirus so we all were appropriate PPE in the room. We will get basic labs and treat with fluids and Zofran.        Amount and/or Complexity of Data Reviewed  Clinical lab tests: ordered and reviewed (Results for orders placed or performed during the hospital encounter of 07/11/20  -CBC WITH AUTOMATED DIFF       Result                      Value             Ref Range           WBC                         11.8 (H)          4.3 - 11.1 K*       RBC                         4.86              4.05 - 5.2 M*       HGB                         14.0              11.7 - 15.4 *       HCT                         42.3              35.8 - 46.3 %       MCV                         87.0              79.6 - 97.8 *       MCH                         28.8              26.1 - 32.9 *       MCHC                        33.1              31.4 - 35.0 *       RDW                         12.9              11.9 - 14.6 %       PLATELET                    346               150 - 450 K/*       MPV                         10.2              9.4 - 12.3 FL       ABSOLUTE NRBC               0.00              0.0 - 0.2 K/*       DF                          AUTOMATED                             NEUTROPHILS                 65                43 - 78 %           LYMPHOCYTES                 24                13 - 44 %           MONOCYTES                   7                 4.0 - 12.0 %        EOSINOPHILS                 1                 0.5 - 7.8 %         BASOPHILS                   0                 0.0 - 2.0 %         IMMATURE GRANULOCYTES       2                 0.0 - 5.0 %         ABS. NEUTROPHILS            7.7               1.7 - 8.2 K/*       ABS. LYMPHOCYTES            2.9               0.5 - 4.6 K/*       ABS. MONOCYTES              0.9               0.1 - 1.3 K/*       ABS. EOSINOPHILS            0.1               0.0 - 0.8 K/*       ABS. BASOPHILS              0.0               0.0 - 0.2 K/*       ABS. IMM.  GRANS.            0.3               0.0 - 0.5 K/*  -LIPASE       Result                      Value             Ref Range           Lipase                      131               73 - 393 U/L -METABOLIC PANEL, COMPREHENSIVE       Result                      Value             Ref Range           Sodium                      147 (H)           136 - 145 mm*       Potassium                   3.3 (L)           3.5 - 5.1 mm*       Chloride                    110 (H)           98 - 107 mmo*       CO2                         29                21 - 32 mmol*       Anion gap                   8                 7 - 16 mmol/L       Glucose                     81                65 - 100 mg/*       BUN                         21                6 - 23 MG/DL        Creatinine                  0.84              0.6 - 1.0 MG*       GFR est AA                  >60               >60 ml/min/1*       GFR est non-AA              >60               >60 ml/min/1*       Calcium                     7.8 (L)           8.3 - 10.4 M*       Bilirubin, total            0.5               0.2 - 1.1 MG*       ALT (SGPT)                  39                12 - 65 U/L         AST (SGOT)                  25                15 - 37 U/L         Alk.  phosphatase            51                50 - 136 U/L        Protein, total              6.5               6.3 - 8.2 g/*       Albumin                     2.7 (L)           3.5 - 5.0 g/*       Globulin                    3.8 (H)           2.3 - 3.5 g/*       A-G Ratio                   0.7 (L)           1.2 - 3.5      -URINALYSIS W/ RFLX MICROSCOPIC       Result                      Value             Ref Range           Color                       YELLOW                                Appearance                  CLEAR                                 Specific gravity            1.024 (H)         1.001 - 1.02*       pH (UA)                     6.5               5.0 - 9.0           Protein                     Negative          NEG mg/dL           Glucose                     Negative          mg/dL               Ketone                      TRACE (A)         NEG mg/dL           Bilirubin Negative          NEG                 Blood                       Negative          NEG                 Urobilinogen                0.2               0.2 - 1.0 EU*       Nitrites                    Negative          NEG                 Leukocyte Esterase          SMALL (A)         NEG                 WBC                         5-10              0 /hpf              RBC                         0-3               0 /hpf              Epithelial cells            0-3               0 /hpf              Bacteria                    0                 0 /hpf              Casts                       0-3               0 /lpf         )    Risk of Complications, Morbidity, and/or Mortality  Presenting problems: high  Diagnostic procedures: moderate  Management options: moderate  General comments: Patient seems to be hemodynamically stable. She is awake and alert at her baseline. Vital signs have been normal.  Blood work is normal and urinalysis is clean. Sending patient home with Zofran as she has no hospitalized will need at this time. I personally reviewed the patient's vital signs, laboratory tests, and/or radiological findings. I discussed these findings with the patient and their significance. I answered all questions and gave the patient clear return precautions. The patient was discharged from the emergency department in stable condition        Patient Progress  Patient progress: improved    ED Course as of Jul 11 1750   Sat Jul 11, 2020   1507 Patient's O2 sats are 100% on room air so I do not intend to repeat a chest x-ray    [JS]   1550 Quick review of the patient's medical record reveals that she was persistently bradycardic throughout her entire hospitalization    [JS]   1552 Patient's blood work is unremarkable. [JS]   0081 No signs of infection. [JS]   56 Had a conversation with the patient's family over the phone.   I explained to them our findings and that I did not feel she met any recurrent hospitalizable criteria. They expressed frustration that we were not keeping her back in the hospital.  I attempted to explain that it is common for people to be fatigued after hospitalizations. It also sounds as if the remainder of the family is not doing well at least in the sense that everybody has been diagnosed with coronavirus. I have a very high suspicion that the family will refuse acceptance of the patient and send her back to the emergency department or that she is likely to be sent back to the ER in the next day or 2. [JS]   9335 Upon pickup by EMS the patient was able to transfer herself from the bed to the stretcher with little to no assistance.     [JS]      ED Course User Index  [JS] Jocelyn Metcalf MD       Procedures

## 2020-07-11 NOTE — ED TRIAGE NOTES
Pt arrives via EMS from home. Discharged from here covid floor yesterday. Everyone at home has covid per EMS. amily states decreased appetiote. Denies nvd. Given 1L of fluid with EMS. Pt has a hx of austim and is at baseline per EMS. NAD. Masked.      Wears 4L NC at all times

## 2020-07-11 NOTE — DISCHARGE INSTRUCTIONS
Her blood work was unremarkable and vital signs are normal at this point. She was mildly dehydrated. No signs of infection at this time. Sending her her home with a prescription for Zofran. We just need to encourage fluids as much as possible throughout the day. Drinking less more frequently can be helpful. Soups are useful in this circumstance. Crackers can be helpful as well.

## 2020-07-11 NOTE — ED NOTES
Spoke with pt's sister via telephone. Pt's family member very upset that pt is not admitted and is being sent back home via [place]. Pt's family member states that when the patient gets back home she is going to call the news station and say the hospital refuses to admit a COVID patient. Informed pt's sister that pt's vitals signs were HR 50, bp 130/60, 100% on RA.

## 2020-07-11 NOTE — PROGRESS NOTES
Patient contacted regarding recent discharge and COVID-19 risk. Discussed COVID-19 related testing which was available at this time. Test results were positive. Patient informed of results, if available? Family aware   Care Transition Nurse/ Ambulatory Care Manager contacted the family by telephone to perform post discharge assessment. Verified name and  with family as identifiers. Patient has following risk factors of: acute respiratory failure and diabetes. CTN/ACM reviewed discharge instructions, medical action plan and red flags related to discharge diagnosis. Reviewed and educated them on any new and changed medications related to discharge diagnosis. Advised obtaining a 90-day supply of all daily and as-needed medications. Education provided regarding infection prevention, and signs and symptoms of COVID-19 and when to seek medical attention with family who verbalized understanding. Discussed exposure protocols and quarantine from [address] you at higher risk for severe illness  and given an opportunity for questions and concerns. The family agrees to contact the COVID-19 hotline [phone] or PCP office for questions related to their healthcare. CTN/ACM provided contact information for future reference. From CDC: Are you at higher risk for severe illness?  Wash your hands often.  Avoid close contact (6 feet, which is about two arm lengths) with people who are sick.  Put distance between yourself and other people if COVID-19 is spreading in your community.  Clean and disinfect frequently touched surfaces.  Avoid all cruise travel and non-essential air travel.  Call your healthcare professional if you have concerns about COVID-19 and your underlying condition or if you are sick.     For more information on steps you can take to protect yourself, see CDC's How to Protect Yourself      Patient/family/caregiver given information for [facility] and agrees to enroll no  Patient's preferred e-mail:  NA  Patient's preferred phone number: NA  Based on Loop alert triggers, patient will be contacted by nurse care manager for worsening symptoms. Plan for follow-up call in 7-14 days based on severity of symptoms and risk factors. Confirmed emerg contact info / healthcare decision maker. Family has not heard from [facility] - gave sister contact info and she will call them later today if necessary.

## 2020-07-12 NOTE — ED NOTES
I have spoken with this patient's mother regarding her discharge. Her mother is concerned that she is not getting admitted because at home she could \"barely life her hands to eat. \" This RN assured patient's mother that the patient was able to walk to the bathroom and was interacting normally with the RN, as I observed during her ER visit.

## 2020-07-13 ENCOUNTER — PATIENT OUTREACH (OUTPATIENT)
Dept: CASE MANAGEMENT | Age: 55
End: 2020-07-13

## 2020-07-13 NOTE — PROGRESS NOTES
Patient contacted regarding COVID-19 diagnosis. Discussed COVID-19 related testing which was available at this time. Test results were positive. Patient informed of results, if available? yes     Care Transition Nurse/ Ambulatory Care Manager contacted the family by telephone to perform post discharge assessment. Verified name and  with family as identifiers. Provided introduction to self, and explanation of the CTN/ACM role, and reason for call due to risk factors for infection and/or exposure to COVID-19. Symptoms reviewed with family who verbalized the following symptoms: no new symptoms and no worsening symptoms. Due to no new or worsening symptoms encounter was not routed to provider for escalation. Discussed follow-up appointments. If no appointment was previously scheduled, appointment scheduling offered: [facility] follow up appointment(s): No future appointments. Non-[facility] follow up appointment(s): n/a      Patient has following risk factors of: acute respiratory failure and diabetes. CTN/ACM reviewed discharge instructions, medical action plan and red flags such as increased shortness of breath, increasing fever and signs of decompensation with family who verbalized understanding. Discussed exposure protocols and quarantine with CDC Guidelines What to do if you are sick with coronavirus disease 2019.  Family was given an opportunity for questions and concerns. The family agrees to contact the [facility] exposure line [phone], [facility] department [facility] [address]: ([phone]) and PCP office for questions related to their healthcare. CTN/ACM provided contact information for future needs. Reviewed and educated family on any new and changed medications related to discharge diagnosis. Patient/family/caregiver given information for [facility] and agrees to enroll yes  Patient's preferred e-mail:  [email or web address]  Patient's preferred phone number: [phone]  Based on Loop alert triggers, patient will be contacted by nurse care manager for worsening symptoms. Pt will be further monitored by COVID Loop Team based on severity of symptoms and risk factors.

## 2020-07-14 ENCOUNTER — PATIENT OUTREACH (OUTPATIENT)
Dept: CASE MANAGEMENT | Age: 55
End: 2020-07-14

## 2020-07-14 NOTE — PROGRESS NOTES
Yellow alert noted in remote symptom monitoring program. Messaged patient to notify Laina Sapp if symptoms have worsened since yesterday or if they would like to have a nurse reach out.

## 2021-03-10 ENCOUNTER — HOSPITAL ENCOUNTER (OUTPATIENT)
Dept: MAMMOGRAPHY | Age: 56
Discharge: HOME | End: 2021-03-10
Attending: FAMILY MEDICINE
Payer: COMMERCIAL

## 2021-03-10 DIAGNOSIS — Z12.39 BREAST SCREENING: ICD-10-CM

## 2021-03-10 PROCEDURE — 77067 SCR MAMMO BI INCL CAD: CPT

## 2022-03-19 PROBLEM — U07.1 COVID-19: Status: ACTIVE | Noted: 2020-07-04

## 2022-03-19 PROBLEM — E03.9 HYPOTHYROID: Status: ACTIVE | Noted: 2020-07-09

## 2022-03-19 PROBLEM — E11.9 DIABETES MELLITUS TYPE 2, CONTROLLED (HCC): Status: ACTIVE | Noted: 2020-07-04

## 2022-03-19 PROBLEM — J96.01 ACUTE RESPIRATORY FAILURE WITH HYPOXEMIA (HCC): Status: ACTIVE | Noted: 2020-07-04

## 2022-03-19 PROBLEM — F84.0 AUTISM DISORDER: Status: ACTIVE | Noted: 2020-07-04

## 2022-03-20 PROBLEM — E78.5 HLD (HYPERLIPIDEMIA): Status: ACTIVE | Noted: 2020-07-09

## 2023-08-29 ENCOUNTER — HOSPITAL ENCOUNTER (OUTPATIENT)
Dept: MAMMOGRAPHY | Age: 58
Discharge: HOME | End: 2023-09-01
Payer: MEDICAID

## 2023-08-29 DIAGNOSIS — Z12.31 ENCOUNTER FOR SCREENING MAMMOGRAM FOR MALIGNANT NEOPLASM OF BREAST: ICD-10-CM

## 2023-08-29 PROCEDURE — 77067 SCR MAMMO BI INCL CAD: CPT

## 2024-08-22 ENCOUNTER — TRANSCRIBE ORDERS (OUTPATIENT)
Dept: SCHEDULING | Age: 59
End: 2024-08-22

## 2024-08-22 DIAGNOSIS — Z12.31 SCREENING MAMMOGRAM FOR HIGH-RISK PATIENT: Primary | ICD-10-CM

## 2024-09-11 ENCOUNTER — HOSPITAL ENCOUNTER (OUTPATIENT)
Dept: MAMMOGRAPHY | Age: 59
Discharge: HOME | End: 2024-09-14
Payer: MEDICAID

## 2024-09-11 VITALS — WEIGHT: 185 LBS | HEIGHT: 65 IN | BODY MASS INDEX: 30.82 KG/M2

## 2024-09-11 DIAGNOSIS — Z12.31 SCREENING MAMMOGRAM FOR HIGH-RISK PATIENT: ICD-10-CM

## 2024-09-11 PROCEDURE — 77067 SCR MAMMO BI INCL CAD: CPT

## 2024-10-06 ENCOUNTER — HOSPITAL ENCOUNTER (EMERGENCY)
Age: 59
Discharge: HOME | End: 2024-10-06
Attending: EMERGENCY MEDICINE
Payer: MEDICAID

## 2024-10-06 ENCOUNTER — APPOINTMENT (OUTPATIENT)
Dept: GENERAL RADIOLOGY | Age: 59
End: 2024-10-06
Payer: MEDICAID

## 2024-10-06 VITALS
DIASTOLIC BLOOD PRESSURE: 74 MMHG | BODY MASS INDEX: 30.82 KG/M2 | SYSTOLIC BLOOD PRESSURE: 120 MMHG | RESPIRATION RATE: 18 BRPM | OXYGEN SATURATION: 100 % | WEIGHT: 185 LBS | HEART RATE: 60 BPM | HEIGHT: 65 IN | TEMPERATURE: 98 F

## 2024-10-06 DIAGNOSIS — S80.02XA CONTUSION OF LEFT KNEE, INITIAL ENCOUNTER: Primary | ICD-10-CM

## 2024-10-06 PROCEDURE — 6360000002 HC RX W HCPCS: Performed by: EMERGENCY MEDICINE

## 2024-10-06 PROCEDURE — 99284 EMERGENCY DEPT VISIT MOD MDM: CPT

## 2024-10-06 PROCEDURE — 73562 X-RAY EXAM OF KNEE 3: CPT

## 2024-10-06 PROCEDURE — 96372 THER/PROPH/DIAG INJ SC/IM: CPT

## 2024-10-06 RX ORDER — METHYLPREDNISOLONE 4 MG
TABLET, DOSE PACK ORAL
Qty: 1 KIT | Refills: 0 | Status: SHIPPED | OUTPATIENT
Start: 2024-10-06 | End: 2024-10-06

## 2024-10-06 RX ORDER — KETOROLAC TROMETHAMINE 30 MG/ML
30 INJECTION, SOLUTION INTRAMUSCULAR; INTRAVENOUS
Status: COMPLETED | OUTPATIENT
Start: 2024-10-06 | End: 2024-10-06

## 2024-10-06 RX ADMIN — KETOROLAC TROMETHAMINE 30 MG: 30 INJECTION, SOLUTION INTRAMUSCULAR at 14:14

## 2024-10-06 ASSESSMENT — PAIN - FUNCTIONAL ASSESSMENT
PAIN_FUNCTIONAL_ASSESSMENT: 0-10
PAIN_FUNCTIONAL_ASSESSMENT: NONE - DENIES PAIN

## 2024-10-06 ASSESSMENT — PAIN SCALES - GENERAL: PAINLEVEL_OUTOF10: 4

## 2024-10-06 ASSESSMENT — LIFESTYLE VARIABLES
HOW OFTEN DO YOU HAVE A DRINK CONTAINING ALCOHOL: NEVER
HOW MANY STANDARD DRINKS CONTAINING ALCOHOL DO YOU HAVE ON A TYPICAL DAY: PATIENT DOES NOT DRINK

## 2024-10-06 ASSESSMENT — PAIN DESCRIPTION - LOCATION: LOCATION: KNEE

## 2024-10-06 ASSESSMENT — PAIN DESCRIPTION - ORIENTATION: ORIENTATION: LEFT

## 2024-10-06 NOTE — ED PROVIDER NOTES
Emergency Department Provider Note       PCP: BHARATI Harvey MD   Age: 59 y.o.   Sex: female     DISPOSITION    Condition at Disposition: Data Unavailable     No diagnosis found.    Medical Decision Making     ***     {Complexity:55988}  {Risk:45698}    I independently ordered and reviewed each unique test.  {external source:57999}   {Historian (state who, why needed, what they said):46872}  {test reviewed:94628}  {EK}  {Admitted or Consultants involved.:71839}  {MIPS URI - Strep - Sinusitis - Pregnant - Head Trauma - Overdose - Agitation:91409}  {SEP1 yes/no:29668}  {Critical Care:58915}    History     Patient lives with her mother.  Patient was walking in the garage and slipped on a wet spot.  She landed onto her left knee and has pain to that area.  Many years ago she did have some arthroscopic type surgery the specifics of which she does not have any awareness or knowledge nor does accompanying sister.  Has persistent swelling.  No redness.  Has pain in the attempt to ambulate.  Denies any other injury with this.  There upper extremity injury.  No head injury.  No back pain.  No hip or other extremity complaint or noted injury.  Right knee specifically is without complaint or abnormality at time of exam    The history is provided by the patient.       ROS     Review of Systems   Constitutional:  Negative for chills and fever.   Respiratory: Negative.     Cardiovascular: Negative.    Genitourinary: Negative.    Musculoskeletal: Negative.         See HPI   Neurological: Negative.    Psychiatric/Behavioral: Negative.  Negative for confusion and decreased concentration.         Physical Exam     Vitals signs and nursing note reviewed:  Vitals:    10/06/24 1018   BP: 121/65   Pulse: 56   Resp: 17   Temp: 97.5 °F (36.4 °C)   TempSrc: Temporal   SpO2: 96%   Weight: 83.9 kg (185 lb)   Height: 1.651 m (5' 5\")      Physical Exam   Procedures     Procedures    No orders of the defined types were placed in

## 2024-10-06 NOTE — ED TRIAGE NOTES
Pt states she fell yesterday and twisted her L knee. Pt unable to put weight on the L leg without pain. Pt denies LOC, hitting head, or blood thinners.

## 2024-10-06 NOTE — DISCHARGE INSTRUCTIONS
Wear knee immobilizer   Cool pack to knee  Over-the-counter medications for discomfort  We have referred you to [facility] for follow-up with ongoing problems / failure to improve  May wish to use a cane for extra stability with ambulation  No overt evidence of ligamentous or cartilaginous injury at the time of exam to exam somewhat limited by

## 2025-05-26 NOTE — ED NOTES
Problem: Pain  Goal: Verbalizes/displays adequate comfort level or baseline comfort level  Outcome: Progressing  Flowsheets (Taken 5/26/2025 1344)  Verbalizes/displays adequate comfort level or baseline comfort level:   Encourage patient to monitor pain and request assistance   Assess pain using appropriate pain scale   Administer analgesics based on type and severity of pain and evaluate response   Implement non-pharmacological measures as appropriate and evaluate response   Consider cultural and social influences on pain and pain management     Problem: Safety - Adult  Goal: Free from fall injury  Outcome: Progressing  Flowsheets (Taken 5/26/2025 1344)  Free From Fall Injury: Instruct family/caregiver on patient safety     Problem: ABCDS Injury Assessment  Goal: Absence of physical injury  Outcome: Progressing  Flowsheets (Taken 5/26/2025 1344)  Absence of Physical Injury: Implement safety measures based on patient assessment     Problem: Nutrition Deficit:  Goal: Optimize nutritional status  Outcome: Progressing  Flowsheets (Taken 5/26/2025 1344)  Nutrient intake appropriate for improving, restoring, or maintaining nutritional needs:   Assess nutritional status and recommend course of action   Monitor oral intake, labs, and treatment plans     Problem: Discharge Planning  Goal: Discharge to home or other facility with appropriate resources  Outcome: Progressing  Flowsheets (Taken 5/26/2025 1344)  Discharge to home or other facility with appropriate resources: Identify barriers to discharge with patient and caregiver     Problem: Skin/Tissue Integrity - Adult  Goal: Skin integrity remains intact  Outcome: Progressing  Flowsheets (Taken 5/26/2025 1344)  Skin Integrity Remains Intact: Monitor for areas of redness and/or skin breakdown     Problem: Gastrointestinal - Adult  Goal: Minimal or absence of nausea and vomiting  Outcome: Progressing  Flowsheets (Taken 5/26/2025 1344)  Minimal or absence of nausea and  I have reviewed discharge instructions with the patient. The patient verbalized understanding. Patient left ED via Discharge Method: stretcher to Home with [facility]). Opportunity for questions and clarification provided. Patient given 1 scripts. No e-sign. To continue your aftercare when you leave the hospital, you may receive an automated call from our care team to check in on how you are doing. This is a free service and part of our promise to provide the best care and service to meet your aftercare needs.  If you have questions, or wish to unsubscribe from this service please call [phone]. Thank you for Choosing our [employer] Emergency Department.

## 2025-09-04 ENCOUNTER — TRANSCRIBE ORDERS (OUTPATIENT)
Dept: SCHEDULING | Age: 60
End: 2025-09-04

## 2025-09-04 DIAGNOSIS — Z12.31 OTHER SCREENING MAMMOGRAM: Primary | ICD-10-CM
